# Patient Record
Sex: MALE | Race: WHITE
[De-identification: names, ages, dates, MRNs, and addresses within clinical notes are randomized per-mention and may not be internally consistent; named-entity substitution may affect disease eponyms.]

---

## 2019-03-06 ENCOUNTER — HOSPITAL ENCOUNTER (INPATIENT)
Dept: HOSPITAL 42 - ED | Age: 59
LOS: 5 days | Discharge: HOME | DRG: 197 | End: 2019-03-11
Attending: INTERNAL MEDICINE | Admitting: INTERNAL MEDICINE
Payer: MEDICAID

## 2019-03-06 DIAGNOSIS — K29.70: ICD-10-CM

## 2019-03-06 DIAGNOSIS — E87.6: ICD-10-CM

## 2019-03-06 DIAGNOSIS — K80.00: Primary | ICD-10-CM

## 2019-03-06 DIAGNOSIS — K52.9: ICD-10-CM

## 2019-03-06 DIAGNOSIS — K57.30: ICD-10-CM

## 2019-03-06 DIAGNOSIS — E78.00: ICD-10-CM

## 2019-03-06 DIAGNOSIS — N40.0: ICD-10-CM

## 2019-03-06 DIAGNOSIS — Z53.31: ICD-10-CM

## 2019-03-06 DIAGNOSIS — E78.5: ICD-10-CM

## 2019-03-06 DIAGNOSIS — K40.20: ICD-10-CM

## 2019-03-06 DIAGNOSIS — F17.210: ICD-10-CM

## 2019-03-06 LAB
ALBUMIN SERPL-MCNC: 4 G/DL (ref 3–4.8)
ALBUMIN/GLOB SERPL: 1.3 {RATIO} (ref 1.1–1.8)
ALT SERPL-CCNC: 23 U/L (ref 7–56)
APPEARANCE UR: CLEAR
AST SERPL-CCNC: 23 U/L (ref 17–59)
BASOPHILS # BLD AUTO: 0.03 K/MM3 (ref 0–2)
BASOPHILS NFR BLD: 0.3 % (ref 0–3)
BILIRUB UR-MCNC: NEGATIVE MG/DL
BUN SERPL-MCNC: 13 MG/DL (ref 7–21)
CALCIUM SERPL-MCNC: 9.5 MG/DL (ref 8.4–10.5)
COLOR UR: YELLOW
EOSINOPHIL # BLD: 0.2 10*3/UL (ref 0–0.7)
EOSINOPHIL NFR BLD: 1.3 % (ref 1.5–5)
ERYTHROCYTE [DISTWIDTH] IN BLOOD BY AUTOMATED COUNT: 13.8 % (ref 11.5–14.5)
GFR NON-AFRICAN AMERICAN: > 60
GLUCOSE UR STRIP-MCNC: NEGATIVE MG/DL
HGB BLD-MCNC: 16.1 G/DL (ref 14–18)
LEUKOCYTE ESTERASE UR-ACNC: NEGATIVE LEU/UL
LIPASE SERPL-CCNC: 54 U/L (ref 23–300)
LYMPHOCYTES # BLD: 1.8 10*3/UL (ref 1.2–3.4)
LYMPHOCYTES NFR BLD AUTO: 16 % (ref 22–35)
MCH RBC QN AUTO: 29.9 PG (ref 25–35)
MCHC RBC AUTO-ENTMCNC: 33.8 G/DL (ref 31–37)
MCV RBC AUTO: 88.7 FL (ref 80–105)
MONOCYTES # BLD AUTO: 0.8 10*3/UL (ref 0.1–0.6)
MONOCYTES NFR BLD: 6.7 % (ref 1–6)
PH UR STRIP: 6 [PH] (ref 4.7–8)
PLATELET # BLD: 258 10^3/UL (ref 120–450)
PMV BLD AUTO: 11.2 FL (ref 7–11)
PROT UR STRIP-MCNC: NEGATIVE MG/DL
RBC # BLD AUTO: 5.38 10^6/UL (ref 3.5–6.1)
RBC # UR STRIP: NEGATIVE /UL
SP GR UR STRIP: 1.02 (ref 1–1.03)
TROPONIN I SERPL-MCNC: < 0.01 NG/ML
UROBILINOGEN UR STRIP-ACNC: 0.2 E.U./DL
WBC # BLD AUTO: 11.3 10^3/UL (ref 4.5–11)

## 2019-03-06 NOTE — ED PDOC
Arrival/HPI





- General


Chief Complaint: Back Pain


Time Seen by Provider: 03/06/19 13:17


Historian: Patient





- History of Present Illness


Narrative History of Present Illness (Text): 





03/06/19 13:58


58 year old male, with no significant past medical history, presents to the 

emergency department complaining of back pain for the past 5 days. Patient 

states he took some pain medication which relieved the pain, but it came back 

and worsened yesterday. He notes secondary abdominal pain and chest discomfort. 

Patient denies fevers, chills, headache, dizziness, shortness of breath, dyspnea

on exertion, cough, nausea, vomiting, diarrhea, neck pain, or any other 

complaint.





PMD: Dr. Stokes


Time/Duration: < week


Symptom Onset: Gradual


Symptom Course: Worsening


Activities at Onset: Light


Context: Home





Past Medical History





- Provider Review


Nursing Documentation Reviewed: Yes





- Infectious Disease


Hx of Infectious Diseases: None





- Genitourinary/Gynecological


Hx Genitourinary Disorders: No





- Psychiatric


Hx Substance Use: No





- Anesthesia


Hx Anesthesia: No


Hx Anesthesia Reactions: No


Hx Malignant Hyperthermia: No





Family/Social History





- Physician Review


Nursing Documentation Reviewed: Yes


Family/Social History: No Known Family HX


Smoking Status: Never Smoked


Hx Alcohol Use: No


Hx Substance Use: No





Allergies/Home Meds


Allergies/Adverse Reactions: 


Allergies





No Known Allergies Allergy (Verified 03/06/19 13:05)


   








Home Medications: 


                                    Home Meds











 Medication  Instructions  Recorded  Confirmed


 


No Known Home Med  03/06/19 03/06/19














Review of Systems





- Physician Review


All systems were reviewed & negative as marked: Yes





- Review of Systems


Constitutional: absent: Fevers


Respiratory: absent: SOB





Physical Exam





- Physical Exam


Narrative Physical Exam (Text): 





03/06/19 14:01


Constitutional: No acute distress.


Head: Normocephalic.  Atraumatic.  


Eyes:  PERRL.


ENT:  Moist mucous membranes.


Neck:  Supple.


Cardiovascular:  Regular rate.


Chest: No tenderness.


Respiratory:  Clear to auscultation bilaterally.


GI:  Soft. Nontender. Nondistended. 


Back:  No CVA tenderness. no midline tenderness.


Musculoskeletal:  No tenderness or swelling of extremities.


Skin:  No rash. 


Neurologic:  Alert, no focal deficit.





Vital Signs Reviewed: Yes





Vital Signs











  Temp Pulse Resp BP Pulse Ox


 


 03/06/19 12:59  97.9 F  73  18  165/96 H  98











Temperature: Afebrile


Blood Pressure: Hypertensive


Pulse: Regular


Respiratory Rate: Normal


Appearance: Positive for: Well-Appearing, Non-Toxic, Comfortable


Pain Distress: None


Mental Status: Positive for: Alert and Oriented X 3





Medical Decision Making


ED Course and Treatment: 





03/06/19 14:02


Impression:


58 year old male who presents to the emergency department complaining of back 

pain. 





Plan:


-- Abdomen and Pelvis CT


-- EKG


-- Labs


-- IV fluids


-- Toradol


-- Urine Culture


-- Urinalysis 


-- Reassess and disposition





Progress Notes:





Pending CT. Signed out to ED night team.





- Lab Interpretations


I have reviewed the lab results: Yes





- RAD Interpretation


Radiology Orders: 











03/06/19 13:54


ABD & PELVIS IV CONTRAST ONLY [CT] Stat 











: Radiologist





- EKG Interpretation


Interpreted by ED Physician: Yes


Type: 12 lead EKG





- Medication Orders


Current Medication Orders: 











Sodium Chloride (Sodium Chloride 0.9%)  1,000 mls @ 999 mls/hr IV .Q1H1M STA


   Stop: 03/06/19 14:54





Discontinued Medications





Ketorolac Tromethamine (Toradol)  30 mg IVP STAT STA


   Stop: 03/06/19 13:55











- Scribe Statement


The provider has reviewed the documentation as recorded by the Shaye Negron





Provider Scribe Attestation:


All medical record entries made by the Scribe were at my direction and 

personally dictated by me. I have reviewed the chart and agree that the record 

accurately reflects my personal performance of the history, physical exam, 

medical decision making, and the department course for this patient. I have also

 personally directed, reviewed, and agree with the discharge instructions and 

disposition.








Disposition/Present on Arrival





- Present on Arrival


Any Indicators Present on Arrival: No


History of DVT/PE: No


History of Uncontrolled Diabetes: No


Urinary Catheter: No


History of Decub. Ulcer: No


History Surgical Site Infection Following: None





- Disposition


Have Diagnosis and Disposition been Completed?: No


Diagnosis: 


 Abdominal pain





Disposition Time: 18:51


Condition: STABLE


Forms:  [employer] (English)

## 2019-03-06 NOTE — ED PDOC
Physical Exam


Vital Signs Reviewed: Yes





Vital Signs











  Temp Pulse Resp BP Pulse Ox


 


 03/06/19 14:52   78  18  155/88 H  98


 


 03/06/19 12:59  97.9 F  73  18  165/96 H  98











Temperature: Afebrile


Blood Pressure: Normal


Pulse: Regular


Respiratory Rate: Normal


Appearance: Positive for: Well-Appearing, Non-Toxic, Comfortable


Pain Distress: None


Mental Status: Positive for: Alert and Oriented X 3





- Systems Exam


Head: Present: Atraumatic, Normocephalic


Pupils: Present: PERRL


Extroacular Muscles: Present: EOMI


Conjunctiva: Present: Normal


Mouth: Present: Moist Mucous Membranes


Neck: No: MIDLINE TENDERNESS


Respiratory/Chest: Present: Clear to Auscultation, Good Air Exchange.  No: 

Respiratory Distress, Accessory Muscle Use


Cardiovascular: Present: Regular Rate and Rhythm, Normal S1, S2.  No: Murmurs


Abdomen: Present: Tenderness (upper abdomen).  No: Distention, Peritoneal Signs


Back: Present: Normal Inspection


Upper Extremity: Present: Normal Inspection.  No: Cyanosis, Edema


Lower Extremity: Present: Normal Inspection.  No: Edema


Neurological: Present: GCS=15, Speech Normal


Skin: Present: Warm, Dry, Normal Color.  No: Rashes


Psychiatric: Present: Alert, Oriented x 3, Normal Insight, Normal Concentration





Medical Decision Making


ED Course and Treatment: 





03/06/19 19:34


Case endorsed to me by  for pending CT abd/pelvis, reassessment and final 

disposition. Patient is a 58 year old male who presented to the emergency 

department earlier today complaining of back pain. Patient is currently resting 

in bed in no acute distress. Patient have no new medical complaints. 





03/06/19 22:02


CT Abdomen and Pelvis:


LUNG BASES: 


The lung bases appear clear. No pleural effusions are seen. 


LIVER: 


There is hepatomegaly.  The liver measured an estimated 20.1 cm in the 

midclavicular line. There is also suggestion of mild associated hepatic 

steatosis. 


GALLBLADDER AND BILE DUCTS: 


There is apparent subtle 2.0 cm partially calcified cholelith within the 

gallbladder neck region. Additionally, subtle pericholecystic edema is noted.  

These findings are thought compatible with acute cholecystitis .No biliary 

ductal dilatation is evident. 


PANCREAS: 


Unremarkable. 


SPLEEN: 


Unremarkable. 


ADRENAL GLANDS: 


Unremarkable. 


KIDNEYS, URETERS, AND BLADDER: 


Both kidneys are normal in size and position.  A 2.5 cm cyst is seen in the 

anterior mid right renal pole. There is no hydronephrosis or hydroureter. No 

urinary calculi are seen. The urinary bladder is normal in size and 

configuration. 


STOMACH AND BOWEL: 


No evidence of bowel obstruction. There is mild mucosal wall thickening and 

fluid filling of the lumen of the stomach and small intestinal tract thought 

compatible with gastritis and diffuse enteritis. Infectious or inflammatory 

etiologies are thought most likely. There is diverticulosis coli seen within the

descending, sigmoid and rectosigmoid colon without evidence of acute 

diverticulitis. 


APPENDIX: 


No evidence of acute appendicitis on CT examination. 


PERITONEUM: 


No free fluid. No free air. Small bilateral inguinal hernias are present which 

each contain fat. 


LYMPH NODES: 


No lymphadenopathy is evident. 


REPRODUCTIVE: 


The seminal vesicles appeared normal and symmetrical in size.  There is 

prostatic hypertrophy noted.  The prostate gland measured 5.9 cm transversely. 


VASCULATURE: 


No evidence of abdominal aortic aneurysm. Minor atherosclerotic vascular 

plaquing is present. 


BONES: 


No aggressive appearing osseous lesion. No acute osseous pathology evident. 


IMPRESSION: 


1.  A solitary 2.0 cm partially calcified cholelith is seen in the gallbladder 

neck. 


2.  Pericholecystic edema.  Findings compatible with acute cholecystitis. 


3.  Evidence of gastritis and diffuse enteritis. 


4.  Hepatomegaly with associated steatosis. 


5.  Diverticulosis coli of the left hemicolon.  No acute diverticulitis. 


6.  Small bilateral inguinal hernias which each contain fat. 


7.  Prostatic hypertrophy.


Electronically signed on Mar 6, 2019 9:38:42 PM EST by:


Robinson Schultz M.D., ANNIA Certified By ABR & CBC[facility]


Fellowship Trained MRI and CT Specialist





03/06/19 22:09


Case discussed with medical resident and Dr. Lizama. Pt will be admitted to [facility] for cholecystitis under the hospitalist service. 





- Lab Interpretations


Lab Results: 





                                        











Troponin I  < 0.01 ng/mL  03/06/19  15:30    








                                        











Total Bilirubin  0.4 mg/dL (0.2-1.3)   03/06/19  15:30    


 


AST  23 U/L (17-59)   03/06/19  15:30    


 


ALT  23 U/L (7-56)   03/06/19  15:30    


 


Alkaline Phosphatase  85 U/L ()   03/06/19  15:30    


 


Total Protein  7.2 g/dL (5.8-8.3)   03/06/19  15:30    


 


Albumin  4.0 g/dL (3.0-4.8)   03/06/19  15:30    


 


Globulin  3.2 gm/dL  03/06/19  15:30    


 


Albumin/Globulin Ratio  1.3  (1.1-1.8)   03/06/19  15:30    








                                        











Lipase  54 U/L ()   03/06/19  15:30    








                                        











Urine Color  Yellow  (YELLOW)   03/06/19  14:40    


 


Urine Appearance  Clear  (CLEAR)   03/06/19  14:40    


 


Urine pH  6.0  (4.7-8.0)   03/06/19  14:40    


 


Ur Specific Gravity  1.025  (1.005-1.035)   03/06/19  14:40    


 


Urine Protein  Negative mg/dL (<30 mg/dL)   03/06/19  14:40    


 


Urine Glucose (UA)  Negative mg/dL (NEGATIVE)   03/06/19  14:40    


 


Urine Ketones  Negative mg/dL (NEGATIVE)   03/06/19  14:40    


 


Urine Blood  Negative  (NEGATIVE)   03/06/19  14:40    


 


Urine Nitrate  Negative  (NEGATIVE)   03/06/19  14:40    


 


Urine Bilirubin  Negative  (NEGATIVE)   03/06/19  14:40    


 


Urine Urobilinogen  0.2 E.U./dL (<1 E.U./dL)   03/06/19  14:40    


 


Ur Leukocyte Esterase  Negative Joseph/uL (NEGATIVE)   03/06/19  14:40    














- RAD Interpretation


Radiology Orders: 











03/06/19 13:54


ABD & PELVIS IV CONTRAST ONLY [CT] Stat 














- Medication Orders


Current Medication Orders: 














Discontinued Medications





Sodium Chloride (Sodium Chloride 0.9%)  1,000 mls @ 999 mls/hr IV .Q1H1M STA


   Stop: 03/06/19 14:54


   Last Admin: 03/06/19 14:44  Dose: 999 mls/hr





eMAR Start Stop


 Document     03/06/19 14:44  EAR  (Rec: 03/06/19 14:45  EAR  [id number])


     Intravenous Solution


      Start Date                                 03/06/19


      Start Time                                 14:44


      End Date                                   03/06/19


      End time                                   15:50


      Total Infusion Time                        66





Ketorolac Tromethamine (Toradol)  30 mg IVP STAT STA


   Stop: 03/06/19 13:55


   Last Admin: 03/06/19 14:47  Dose: 30 mg





MAR Pain Assessment


 Document     03/06/19 14:47  EAR  (Rec: 03/06/19 14:48  EAR  [id number])


     Pain Reassessment


      Is this a pain reassessment?               Yes


IVP Administration


 Document     03/06/19 14:47  EAR  (Rec: 03/06/19 14:48  EAR  [id number])


     Charges for Administration


      # of IVP Administrations                   1














- Scribe Statement


The provider has reviewed the documentation as recorded by the Scribe Dalyngs Duvelsaint training with Mild[employer].





All medical record entries made by the Shaye were at my direction and 

personally dictated by me. I have reviewed the chart and agree that the record 

accurately reflects my personal performance of the history, physical exam, 

medical decision making, and the department course for this patient. I have also

 personally directed, reviewed, and agree with the discharge instructions and 

disposition. 





Disposition/Present on Arrival





- Present on Arrival


Any Indicators Present on Arrival: No


History of DVT/PE: No


History of Uncontrolled Diabetes: No


Urinary Catheter: No


History of Decub. Ulcer: No


History Surgical Site Infection Following: None





- Disposition


Have Diagnosis and Disposition been Completed?: Yes


Diagnosis: 


 Abdominal pain, Cholecystitis





Disposition: HOSPITALIZED


Disposition Time: 22:15


Patient Plan: Admission


Patient Problems: 


                             Current Active Problems











Problem Status Onset


 


Abdominal pain Acute 











Condition: STABLE


Forms:  [employer] (English)

## 2019-03-06 NOTE — CP.PCM.CON
History of Present Illness





- History of Present Illness


History of Present Illness: 


General Surgery Consult note for Dr. Palumbo


   Consulted for Cholecystitis





Patient is a 58 M with no PMH who presents with RUQ pain radiating to back x 5-7

days.  Patient otherwise denies any HA, n/v, f/c, stool changes, chest pain, SOB

and dizziness.





PMH: denies 


PSH: appendectomy (in childhood)


Social: denies ETOH, smoking and illicit drugs


All: nkda








Review of Systems





- Review of Systems


All systems: reviewed and no additional remarkable complaints except (as per 

HPI)





Past Patient History





- Infectious Disease


Hx of Infectious Diseases: None





- Past Social History


Smoking Status: Never Smoked





- GENITOURINARY/GYNECOLOGICAL


Hx Genitourinary Disorders: No





- PSYCHIATRIC


Hx Substance Use: No





- ANESTHESIA


Hx Anesthesia: No


Hx Anesthesia Reactions: No


Hx Malignant Hyperthermia: No





Meds


Allergies/Adverse Reactions: 


                                    Allergies











Allergy/AdvReac Type Severity Reaction Status Date / Time


 


No Known Allergies Allergy   Verified 03/06/19 13:05














- Medications


Medications: 


                               Current Medications





Metronidazole (Flagyl)  500 mg in 100 mls @ 100 mls/hr IVPB Q8 SANTI; Protocol


Ceftriaxone Sodium (Rocephin 1 Gram Ivpb)  1 gm in 100 mls @ 100 mls/hr IVPB 

DAILY SANTI; Protocol


Sodium Chloride (Sodium Chloride 0.9%)  1,000 mls @ 100 mls/hr IV .Q10H SANTI


Morphine Sulfate (Morphine)  2 mg IVP Q6H PRN


   PRN Reason: Pain, severe (8-10)


Ondansetron HCl (Zofran Inj)  4 mg IVP Q6H PRN


   PRN Reason: Nausea/Vomiting


Pantoprazole Sodium (Protonix Inj)  40 mg IVP DAILY SANTI











Physical Exam





- Constitutional


Appears: Well, Non-toxic, No Acute Distress





- Head Exam


Head Exam: ATRAUMATIC





- Eye Exam


Eye Exam: EOMI





- ENT Exam


ENT Exam: Mucous Membranes Moist





- Respiratory Exam


Respiratory Exam: NORMAL BREATHING PATTERN





- Cardiovascular Exam


Cardiovascular Exam: REGULAR RHYTHM





- GI/Abdominal Exam


GI & Abdominal Exam: Soft, Tenderness (RUQ).  absent: Guarding





- Extremities Exam


Extremities exam: Negative for: calf tenderness





- Neurological Exam


Neurological exam: Alert, Oriented x3





- Psychiatric Exam


Psychiatric exam: Normal Affect, Normal Mood





- Skin


Skin Exam: Dry, Intact, Normal Color, Warm





Results





- Vital Signs


Recent Vital Signs: 


                                Last Vital Signs











Temp  97.9 F   03/06/19 12:59


 


Pulse  78   03/06/19 14:52


 


Resp  18   03/06/19 14:52


 


BP  155/88 H  03/06/19 14:52


 


Pulse Ox  98   03/06/19 14:52














- Labs


Result Diagrams: 


                                 03/08/19 06:10





                                 03/08/19 06:10


Labs: 


                         Laboratory Results - last 24 hr











  03/06/19 03/06/19 03/06/19





  14:40 14:40 15:30


 


WBC  11.3 H  


 


RBC  5.38  


 


Hgb  16.1  


 


Hct  47.7  


 


MCV  88.7  


 


MCH  29.9  


 


MCHC  33.8  


 


RDW  13.8  


 


Plt Count  258  


 


MPV  11.2 H  


 


Neut % (Auto)  75.7 H  


 


Lymph % (Auto)  16.0 L  


 


Mono % (Auto)  6.7 H  


 


Eos % (Auto)  1.3 L  


 


Baso % (Auto)  0.3  


 


Lymph # (Auto)  1.8  


 


Mono # (Auto)  0.8 H  


 


Eos # (Auto)  0.2  


 


Baso # (Auto)  0.03  


 


Absolute Neuts (auto)  8.52 H  


 


Sodium    139


 


Potassium    4.4


 


Chloride    106


 


Carbon Dioxide    26


 


Anion Gap    12


 


BUN    13


 


Creatinine    0.8


 


Est GFR ( Amer)    > 60


 


Est GFR (Non-Af Amer)    > 60


 


Random Glucose    93


 


Calcium    9.5


 


Total Bilirubin    0.4


 


AST    23


 


ALT    23


 


Alkaline Phosphatase    85


 


Total Creatine Kinase    88


 


Troponin I    < 0.01


 


Total Protein    7.2


 


Albumin    4.0


 


Globulin    3.2


 


Albumin/Globulin Ratio    1.3


 


Lipase    54


 


Urine Color   Yellow 


 


Urine Appearance   Clear 


 


Urine pH   6.0 


 


Ur Specific Gravity   1.025 


 


Urine Protein   Negative 


 


Urine Glucose (UA)   Negative 


 


Urine Ketones   Negative 


 


Urine Blood   Negative 


 


Urine Nitrate   Negative 


 


Urine Bilirubin   Negative 


 


Urine Urobilinogen   0.2 


 


Ur Leukocyte Esterase   Negative 














Assessment & Plan





- Assessment and Plan (Free Text)


Assessment: 





58 yr old male with cholelithiasis and acute cholecystitis


Plan: 


IVF


rocephin, flagyl


pain control


zofran


SCD


Abd US


possible OR tomorrow for cholecystectomy


will discuss with Dr. Linwood Grace, PGY 1








- Date & Time


Date: 03/07/19


Time: 23:35

## 2019-03-07 LAB
ALBUMIN SERPL-MCNC: 3.7 G/DL (ref 3–4.8)
ALBUMIN/GLOB SERPL: 1.1 {RATIO} (ref 1.1–1.8)
ALT SERPL-CCNC: 20 U/L (ref 7–56)
APTT BLD: 34 SECONDS (ref 26.9–38.3)
AST SERPL-CCNC: 31 U/L (ref 17–59)
BASOPHILS # BLD AUTO: 0.02 K/MM3 (ref 0–2)
BASOPHILS NFR BLD: 0.2 % (ref 0–3)
BUN SERPL-MCNC: 10 MG/DL (ref 7–21)
CALCIUM SERPL-MCNC: 8.8 MG/DL (ref 8.4–10.5)
EOSINOPHIL # BLD: 0.2 10*3/UL (ref 0–0.7)
EOSINOPHIL NFR BLD: 1.6 % (ref 1.5–5)
ERYTHROCYTE [DISTWIDTH] IN BLOOD BY AUTOMATED COUNT: 13.9 % (ref 11.5–14.5)
GFR NON-AFRICAN AMERICAN: > 60
HGB BLD-MCNC: 14.3 G/DL (ref 14–18)
INR PPP: 1.14
LYMPHOCYTES # BLD: 1.7 10*3/UL (ref 1.2–3.4)
LYMPHOCYTES NFR BLD AUTO: 17.2 % (ref 22–35)
MCH RBC QN AUTO: 28.8 PG (ref 25–35)
MCHC RBC AUTO-ENTMCNC: 32.8 G/DL (ref 31–37)
MCV RBC AUTO: 87.7 FL (ref 80–105)
MONOCYTES # BLD AUTO: 0.9 10*3/UL (ref 0.1–0.6)
MONOCYTES NFR BLD: 8.8 % (ref 1–6)
PLATELET # BLD: 233 10^3/UL (ref 120–450)
PMV BLD AUTO: 10.9 FL (ref 7–11)
PROTHROMBIN TIME: 12.9 SECONDS (ref 9.4–12.5)
RBC # BLD AUTO: 4.97 10^6/UL (ref 3.5–6.1)
WBC # BLD AUTO: 9.7 10^3/UL (ref 4.5–11)

## 2019-03-07 RX ADMIN — METRONIDAZOLE SCH MLS/HR: 500 INJECTION, SOLUTION INTRAVENOUS at 21:23

## 2019-03-07 RX ADMIN — MORPHINE SULFATE PRN MG: 2 INJECTION, SOLUTION INTRAMUSCULAR; INTRAVENOUS at 09:55

## 2019-03-07 RX ADMIN — METRONIDAZOLE SCH MLS/HR: 500 INJECTION, SOLUTION INTRAVENOUS at 05:08

## 2019-03-07 RX ADMIN — MORPHINE SULFATE PRN MG: 2 INJECTION, SOLUTION INTRAMUSCULAR; INTRAVENOUS at 21:22

## 2019-03-07 RX ADMIN — CEFTRIAXONE SCH MLS/HR: 1 INJECTION, SOLUTION INTRAVENOUS at 11:31

## 2019-03-07 RX ADMIN — METRONIDAZOLE SCH MLS/HR: 500 INJECTION, SOLUTION INTRAVENOUS at 14:42

## 2019-03-07 NOTE — CP.PCM.HP
<BrittneylilianachristinaPhillip - Last Filed: 19 02:33>





History of Present Illness





- History of Present Illness


History of Present Illness: 





PGY1 Hospitalist History and Physical Exam Note for Dr. Lizama 


This is a 58 year old Icelandic speaking M with PMH of hyperlipidemia who [place] 

[place] [facility] x5 days. Patient states he took some pain medication 

which relieved the pain, but it came back and moved towards his chest and then 

moved towards his abdomen. The pain worsened yesterday. Patient also notes 

associated nausea, anorexia, and abdominal distention. ROS is otherwise 

unremarkable for fevers, chills, headache, dizziness, shortness of breath, 

dyspnea on exertion, cough, nausea, vomiting, diarrhea, neck pain, or any other 

complaint.





PMH: Hyperlipidemia


PSH: Appendectomy 


Family Hx: Father  (hx HTN, DM, sudden death 2/2 unknown cause); Mother:

; hx HTN 


Social Hx Denies ETOH. Admits to Tobacco 1/2 pack per day x30 years. No 

recreational drugs 


Medications: None 


Allergies: NKDA 


PMD: Dr. Stokes








Present on Admission





- Present on Admission


Any Indicators Present on Admission: No


History of DVT/PE: No


History of Uncontrolled Diabetes: No


Urinary Catheter: No


Decubitus Ulcer Present: No





Review of Systems





- Review of Systems


All systems: reviewed and no additional remarkable complaints except (as 

mentioned in HPI)





Past Patient History





- Infectious Disease


Hx of Infectious Diseases: None





- Past Social History


Smoking Status: Heavy Smoker > 10 Cigarettes Daily





- CARDIAC


Hx Cardiac Disorders: Yes


Hx Hypercholesterolemia: Yes





- MUSCULOSKELETAL/RHEUMATOLOGICAL


Hx Falls: No





- GENITOURINARY/GYNECOLOGICAL


Hx Genitourinary Disorders: No





- PSYCHIATRIC


Hx Substance Use: No





- SURGICAL HISTORY


Hx Surgeries: Yes


Hx Cholecystectomy: Yes





- ANESTHESIA


Hx Anesthesia: No


Hx Anesthesia Reactions: No


Hx Malignant Hyperthermia: No





Meds


Allergies/Adverse Reactions: 


                                    Allergies











Allergy/AdvReac Type Severity Reaction Status Date / Time


 


No Known Allergies Allergy   Verified 19 13:05














Physical Exam





- Additional Findings


Additional findings: 





- Constitutional


Appears: Well, Non-toxic, No Acute Distress





- Head Exam


Head Exam: ATRAUMATIC





- Eye Exam


Eye Exam: EOMI





- ENT Exam


ENT Exam: Mucous Membranes Moist





- Respiratory Exam


Respiratory Exam: NORMAL BREATHING PATTERN





- Cardiovascular Exam


Cardiovascular Exam: REGULAR RHYTHM





- GI/Abdominal Exam


GI & Abdominal Exam: Soft, Tenderness (RUQ).  absent: Guarding





- Extremities Exam


Extremities exam: Negative for: calf tenderness





- Neurological Exam


Neurological exam: Alert, Oriented x3





- Psychiatric Exam


Psychiatric exam: Normal Affect, Normal Mood





- Skin


Skin Exam: Dry, Intact, Normal Color, Warm





Results





- Vital Signs


Recent Vital Signs: 





                                Last Vital Signs











Temp  97.9 F   19 12:59


 


Pulse  80   19 22:30


 


Resp  20   19 01:39


 


BP  132/82   19 22:30


 


Pulse Ox  98   19 22:30














- Labs


Result Diagrams: 


                                 19 14:40





                                 19 15:30


Labs: 





                         Laboratory Results - last 24 hr











  19





  14:40 14:40 15:30


 


WBC  11.3 H  


 


RBC  5.38  


 


Hgb  16.1  


 


Hct  47.7  


 


MCV  88.7  


 


MCH  29.9  


 


MCHC  33.8  


 


RDW  13.8  


 


Plt Count  258  


 


MPV  11.2 H  


 


Neut % (Auto)  75.7 H  


 


Lymph % (Auto)  16.0 L  


 


Mono % (Auto)  6.7 H  


 


Eos % (Auto)  1.3 L  


 


Baso % (Auto)  0.3  


 


Lymph # (Auto)  1.8  


 


Mono # (Auto)  0.8 H  


 


Eos # (Auto)  0.2  


 


Baso # (Auto)  0.03  


 


Absolute Neuts (auto)  8.52 H  


 


Sodium    139


 


Potassium    4.4


 


Chloride    106


 


Carbon Dioxide    26


 


Anion Gap    12


 


BUN    13


 


Creatinine    0.8


 


Est GFR ( Amer)    > 60


 


Est GFR (Non-Af Amer)    > 60


 


Random Glucose    93


 


Calcium    9.5


 


Total Bilirubin    0.4


 


AST    23


 


ALT    23


 


Alkaline Phosphatase    85


 


Total Creatine Kinase    88


 


Troponin I    < 0.01


 


Total Protein    7.2


 


Albumin    4.0


 


Globulin    3.2


 


Albumin/Globulin Ratio    1.3


 


Lipase    54


 


Urine Color   Yellow 


 


Urine Appearance   Clear 


 


Urine pH   6.0 


 


Ur Specific Gravity   1.025 


 


Urine Protein   Negative 


 


Urine Glucose (UA)   Negative 


 


Urine Ketones   Negative 


 


Urine Blood   Negative 


 


Urine Nitrate   Negative 


 


Urine Bilirubin   Negative 


 


Urine Urobilinogen   0.2 


 


Ur Leukocyte Esterase   Negative 














Assessment & Plan





- Assessment and Plan (Free Text)


Assessment: 








This is a 58 year old Icelandic speaking M with PMH of hyperlipidemia who [place] 

[place] [facility] [place] x5 days. Patient states he took some pain medication 

which relieved the pain, but it came back and moved towards his chest and then 

moved towards his abdomen.





Abdominal Pain / Back pain likely secondary to Cholecystitis 


- CT ABD/PELVIS: 


IMPRESSION: 


1.  A solitary 2.0 cm partially calcified cholelith is seen in the gallbladder 

neck. 


2.  Pericholecystic edema.  Findings compatible with acute cholecystitis. 


3.  Evidence of gastritis and diffuse enteritis. 


4.  Hepatomegaly with associated steatosis. 


5.  Diverticulosis coli of the left hemicolon.  No acute diverticulitis. 


6.  Small bilateral inguinal hernias which each contain fat. 


7.  Prostatic hypertrophy.


- CXR f/u report


- EKG obtained unremarkable for T wave abnormalities


- Leukocytosis (11.3)


- General Surgery (Dr. Palumbo) consulted; recommendations appreciated


- NPO 


- Morphine IVP 2mg Q6 PRN for severe pain 


- Zofran PRN for nausea


- IVF NS @100mls/hr


- Abx: Rocephin, Flagyl


- F/U blood cultures


- AM labs (CBC, CMP, Mg, Phos) 





PPx: 


- Diet: NPO 


- GI: Protonix 


- DVT: SCD





Patient seen and case discussed with Dr. Alda Sawant PGY1 








<Angeles Lizama - Last Filed: 19 06:51>





Results





- Vital Signs


Recent Vital Signs: 





                                Last Vital Signs











Temp  97.9 F   19 12:59


 


Pulse  80   19 22:30


 


Resp  20   19 01:39


 


BP  132/82   19 22:30


 


Pulse Ox  98   19 22:30














- Labs


Result Diagrams: 


                                 19 14:40





                                 19 15:30


Labs: 





                         Laboratory Results - last 24 hr











  19





  14:40 14:40 15:30


 


WBC  11.3 H  


 


RBC  5.38  


 


Hgb  16.1  


 


Hct  47.7  


 


MCV  88.7  


 


MCH  29.9  


 


MCHC  33.8  


 


RDW  13.8  


 


Plt Count  258  


 


MPV  11.2 H  


 


Neut % (Auto)  75.7 H  


 


Lymph % (Auto)  16.0 L  


 


Mono % (Auto)  6.7 H  


 


Eos % (Auto)  1.3 L  


 


Baso % (Auto)  0.3  


 


Lymph # (Auto)  1.8  


 


Mono # (Auto)  0.8 H  


 


Eos # (Auto)  0.2  


 


Baso # (Auto)  0.03  


 


Absolute Neuts (auto)  8.52 H  


 


Sodium    139


 


Potassium    4.4


 


Chloride    106


 


Carbon Dioxide    26


 


Anion Gap    12


 


BUN    13


 


Creatinine    0.8


 


Est GFR ( Amer)    > 60


 


Est GFR (Non-Af Amer)    > 60


 


Random Glucose    93


 


Calcium    9.5


 


Total Bilirubin    0.4


 


AST    23


 


ALT    23


 


Alkaline Phosphatase    85


 


Total Creatine Kinase    88


 


Troponin I    < 0.01


 


Total Protein    7.2


 


Albumin    4.0


 


Globulin    3.2


 


Albumin/Globulin Ratio    1.3


 


Lipase    54


 


Urine Color   Yellow 


 


Urine Appearance   Clear 


 


Urine pH   6.0 


 


Ur Specific Gravity   1.025 


 


Urine Protein   Negative 


 


Urine Glucose (UA)   Negative 


 


Urine Ketones   Negative 


 


Urine Blood   Negative 


 


Urine Nitrate   Negative 


 


Urine Bilirubin   Negative 


 


Urine Urobilinogen   0.2 


 


Ur Leukocyte Esterase   Negative 














Attending/Attestation





- Attestation


I have personally seen and examined this patient.: Yes


I have fully participated in the care of the patient.: Yes


I have reviewed all pertinent clinical information: Yes


Notes (Text): 





19 06:51


Seen and examined. Discussed with resident. Agree with note. Pt. also C/O GERD 

like symptoms, Protonix will be added.

## 2019-03-07 NOTE — US
Date of service: 



03/07/2019



HISTORY:

RUQ pain



COMPARISON:

None.



TECHNIQUE:

Sonographic evaluation of the abdomen.



FINDINGS:



LIVER:

Measures 20 cm.  Increased echogenicity of the liver parenchyma. No 

mass. No intrahepatic bile duct dilatation.



GALLBLADDER:

There is a large stone in the neck of the gallbladder measuring 2.7 

cm in length.  There thickening of the gallbladder wall measuring 6 

mm.



COMMON BILE DUCT:

Measures 3.3 mm. No stones. No dilatation.



PANCREAS:

Not well visualized



RIGHT KIDNEY:

Measures 10.0 x 4.8 x 6.5cm.  Normal echogenicity. No calculus, mass, 

or hydronephrosis.  2.2 cm cyst 



LEFT KIDNEY:

Measures 11.7 x 4.7 x 6.0cm.  Normal echogenicity. No calculus, mass, 

or hydronephrosis.



SPLEEN:

Normal in size and contour. No mass.  10.8 x 4.3 x 4.0 cm 



AORTA:

Not visualized 



IVC:

Not visualized 



OTHER FINDINGS:

The report concurs with the preliminary [facility] report 



IMPRESSION:

There is a large stone in the neck of the gallbladder measuring 2.7 

cm in length.  There thickening of the gallbladder wall measuring 6 

mm.



Fatty infiltration of the liver

## 2019-03-07 NOTE — CP.PCM.PN
Subjective





- Date & Time of Evaluation


Date of Evaluation: 03/07/19


Time of Evaluation: 08:00





- Subjective


Subjective: 





Rusty Elizabeth, PGY-1 Progress Note for Dr. Palumbo





Patient seen and evaluated at bedside. No acute events reported overnight since 

admission. Reports RUQ abdominal pain but denies fevers, chills, N/V, 

constipation, diarrhea.





Objective





- Vital Signs/Intake and Output


Vital Signs (last 24 hours): 


                                        











Temp Pulse Resp BP Pulse Ox


 


 99.2 F   79   22   133/73   96 


 


 03/07/19 06:00  03/07/19 06:00  03/07/19 06:00  03/07/19 06:00  03/07/19 06:00








Intake and Output: 


                                        











 03/07/19 03/07/19





 06:59 18:59


 


Intake Total 500 


 


Balance 500 














- Medications


Medications: 


                               Current Medications





Metronidazole (Flagyl)  500 mg in 100 mls @ 100 mls/hr IVPB Q8 [facility]; Protocol


   Last Admin: 03/07/19 05:08 Dose:  100 mls/hr


Ceftriaxone Sodium (Rocephin 1 Gram Ivpb)  1 gm in 100 mls @ 100 mls/hr IVPB 

DAILY [facility]; Protocol


Sodium Chloride (Sodium Chloride 0.9%)  1,000 mls @ 100 mls/hr IV .Q10H [facility]


   Last Admin: 03/07/19 01:33 Dose:  100 mls/hr


Morphine Sulfate (Morphine)  2 mg IVP Q6H PRN


   PRN Reason: Pain, severe (8-10)


Ondansetron HCl (Zofran Inj)  4 mg IVP Q6H PRN


   PRN Reason: Nausea/Vomiting


Pantoprazole Sodium (Protonix Inj)  40 mg IVP DAILY [facility]











- Labs


Labs: 


                                        





                                 03/07/19 06:50 





                                 03/07/19 06:50 





                                        











PT  12.9 SECONDS (9.4-12.5)  H  03/07/19  06:50    


 


INR  1.14   03/07/19  06:50    


 


APTT  34.0 Seconds (26.9-38.3)   03/07/19  06:50    














- Additional Findings


Additional findings: 





- Constitutional


Appears: Well, Non-toxic, No Acute Distress





- Head Exam


Head Exam: ATRAUMATIC





- Eye Exam


Eye Exam: EOMI. Nonicteric sclera.





- ENT Exam


ENT Exam: Mucous Membranes Moist





- Respiratory Exam


Respiratory Exam: NORMAL BREATHING PATTERN





- Cardiovascular Exam


Cardiovascular Exam: REGULAR RHYTHM





- GI/Abdominal Exam


GI & Abdominal Exam: Soft, Tenderness (RUQ). Equivocal Abel's sign with mild 

tenderness elicited.  absent: Guarding





- Extremities Exam


Extremities exam: Negative for: calf tenderness





- Neurological Exam


Neurological exam: Alert, Oriented x3





- Psychiatric Exam


Psychiatric exam: Normal Affect, Normal Mood





- Skin


Skin Exam: Dry, Intact, Normal Color, Warm. No juandice





Assessment and Plan





- Assessment and Plan (Free Text)


Assessment: 





58 yr old male with cholelithiasis and acute cholecystitis





Plan: 


- IVF, NPO


- ABx


- F/u final U/S report


- Pain control


- Zofran


- SCD


- Lap Anu likely tomorrow





Patient seen and plan discussed with Dr. Linwood Elizabeth, PGY 1

## 2019-03-07 NOTE — RAD
Date of service: 



03/07/2019



HISTORY:

 preoperative 



COMPARISON:

None available.



FINDINGS:



LUNGS:

No active pulmonary disease.



PLEURA:

No significant pleural effusion identified, no pneumothorax apparent.



CARDIOVASCULAR:

No aortic atherosclerotic calcification present.



 Normal cardiac size. No pulmonary vascular congestion. 



OSSEOUS STRUCTURES:

No significant abnormalities.



VISUALIZED UPPER ABDOMEN:

Normal.



OTHER FINDINGS:

None.



IMPRESSION:

No acute cardiopulmonary disease appreciated.

## 2019-03-07 NOTE — CT
Date of service: 



03/06/2019



PROCEDURE:  CT Abdomen and Pelvis with contrast



HISTORY:

abd pain



COMPARISON:

None.



TECHNIQUE:

Contrast dose: 



Radiation dose:



Total exam DLP = 874.26 mGy-cm.



This CT exam was performed using one or more of the following dose 

reduction techniques: Automated exposure control, adjustment of the 

mA and/or kV according to patient size, and/or use of iterative 

reconstruction technique.



FINDINGS:



LOWER THORAX:

Unremarkable. 



LIVER:

Unremarkable. No gross lesion or ductal dilatation. 



GALLBLADDER AND BILE DUCTS:

2 cm stone in the neck of the gallbladder.  Pericholecystic fluid 

consistent with cholecystitis 



PANCREAS:

Unremarkable. No gross lesion or ductal dilatation.



SPLEEN:

Unremarkable. 



ADRENALS:

Unremarkable. No mass. 



KIDNEYS AND URETERS:

Unremarkable. No hydronephrosis. No solid mass. 



VASCULATURE:

Unremarkable. No aortic aneurysm. No aortic atherosclerotic 

calcification or mural plaque present.



BOWEL:

Unremarkable. No obstruction. No gross mural thickening. 

Diverticulosis of the sigmoid colon without evidence of diverticulitis



APPENDIX:

Normal appendix. 



PERITONEUM:

Unremarkable. No free fluid. No free air. 



LYMPH NODES:

Unremarkable. No enlarged lymph nodes. 



BLADDER:

Unremarkable. 



REPRODUCTIVE:

Unremarkable. 



BONES:

No acute fracture. 



OTHER FINDINGS:

The report concurs with the preliminary [facility] report



IMPRESSION:

2 cm stone in the neck of the gallbladder.  Pericholecystic fluid 

consistent with cholecystitis

## 2019-03-08 LAB
ALBUMIN SERPL-MCNC: 3.6 G/DL (ref 3–4.8)
ALBUMIN/GLOB SERPL: 1.1 {RATIO} (ref 1.1–1.8)
ALT SERPL-CCNC: 18 U/L (ref 7–56)
AST SERPL-CCNC: 21 U/L (ref 17–59)
BASOPHILS # BLD AUTO: 0.02 K/MM3 (ref 0–2)
BASOPHILS NFR BLD: 0.2 % (ref 0–3)
BUN SERPL-MCNC: 11 MG/DL (ref 7–21)
CALCIUM SERPL-MCNC: 8.8 MG/DL (ref 8.4–10.5)
EOSINOPHIL # BLD: 0.1 10*3/UL (ref 0–0.7)
EOSINOPHIL NFR BLD: 1.2 % (ref 1.5–5)
ERYTHROCYTE [DISTWIDTH] IN BLOOD BY AUTOMATED COUNT: 14 % (ref 11.5–14.5)
GFR NON-AFRICAN AMERICAN: > 60
HGB BLD-MCNC: 13.9 G/DL (ref 14–18)
INR PPP: 1.27
LYMPHOCYTES # BLD: 2.2 10*3/UL (ref 1.2–3.4)
LYMPHOCYTES NFR BLD AUTO: 24 % (ref 22–35)
MCH RBC QN AUTO: 29.1 PG (ref 25–35)
MCHC RBC AUTO-ENTMCNC: 33.1 G/DL (ref 31–37)
MCV RBC AUTO: 88.1 FL (ref 80–105)
MONOCYTES # BLD AUTO: 0.9 10*3/UL (ref 0.1–0.6)
MONOCYTES NFR BLD: 9.9 % (ref 1–6)
PLATELET # BLD: 216 10^3/UL (ref 120–450)
PMV BLD AUTO: 10.8 FL (ref 7–11)
PROTHROMBIN TIME: 14.4 SECONDS (ref 9.4–12.5)
RBC # BLD AUTO: 4.77 10^6/UL (ref 3.5–6.1)
WBC # BLD AUTO: 9.3 10^3/UL (ref 4.5–11)

## 2019-03-08 PROCEDURE — 0FT40ZZ RESECTION OF GALLBLADDER, OPEN APPROACH: ICD-10-PCS | Performed by: SURGERY

## 2019-03-08 PROCEDURE — 0FJ44ZZ INSPECTION OF GALLBLADDER, PERCUTANEOUS ENDOSCOPIC APPROACH: ICD-10-PCS | Performed by: SURGERY

## 2019-03-08 RX ADMIN — MORPHINE SULFATE PRN MG: 2 INJECTION, SOLUTION INTRAMUSCULAR; INTRAVENOUS at 07:51

## 2019-03-08 RX ADMIN — CEFTRIAXONE SCH MLS: 1 INJECTION, SOLUTION INTRAVENOUS at 11:17

## 2019-03-08 RX ADMIN — METRONIDAZOLE SCH MLS/HR: 500 INJECTION, SOLUTION INTRAVENOUS at 05:08

## 2019-03-08 RX ADMIN — HYDROMORPHONE HYDROCHLORIDE PRN MG: 1 INJECTION, SOLUTION INTRAMUSCULAR; INTRAVENOUS; SUBCUTANEOUS at 14:36

## 2019-03-08 RX ADMIN — HYDROMORPHONE HYDROCHLORIDE PRN MG: 1 INJECTION, SOLUTION INTRAMUSCULAR; INTRAVENOUS; SUBCUTANEOUS at 14:18

## 2019-03-08 RX ADMIN — HYDROMORPHONE HYDROCHLORIDE PRN MG: 1 INJECTION, SOLUTION INTRAMUSCULAR; INTRAVENOUS; SUBCUTANEOUS at 20:02

## 2019-03-08 RX ADMIN — METRONIDAZOLE SCH MLS/HR: 500 INJECTION, SOLUTION INTRAVENOUS at 11:30

## 2019-03-08 RX ADMIN — OXYCODONE HYDROCHLORIDE AND ACETAMINOPHEN PRN TAB: 5; 325 TABLET ORAL at 23:36

## 2019-03-08 RX ADMIN — HYDROMORPHONE HYDROCHLORIDE PRN MG: 1 INJECTION, SOLUTION INTRAMUSCULAR; INTRAVENOUS; SUBCUTANEOUS at 14:53

## 2019-03-08 RX ADMIN — METRONIDAZOLE SCH MLS/HR: 500 INJECTION, SOLUTION INTRAVENOUS at 23:28

## 2019-03-08 NOTE — CARD
--------------- APPROVED REPORT --------------





Date of service: 03/08/2019



EKG Measurement

Heart [id number]

IL [id number]

[id number]

[id number]

[id number]



<Conclusion>

Normal sinus rhythm

Right bundle branch block

Abnormal ECG

## 2019-03-08 NOTE — PCM.SURG1
Surgeon's Initial Post Op Note





- Surgeon's Notes


Surgeon: Dr. Palumbo


Assistant: Shant PGY3; Rosa PGY2


Type of Anesthesia: General Endo, Local


Anesthesia Administered By: Dr. Hart


Pre-Operative Diagnosis: Acute on Chronic Cholecystitis


Operative Findings: See operative report. Inflammed, thickened gallbladder


Post-Operative Diagnosis: Same


Operation Performed: Laparoscopic Converted to Open Subtotal Cholecystectomy


Specimen/Specimens Removed: Gallbladder. GB aspirate sent for Culture and gram 

stain


Estimated Blood Loss: EBL {In ML}: 150


Blood Products Given: N/A


Drains Used: No Drains


Post-Op Condition: Good


Date of Surgery/Procedure: 03/08/19


Time of Surgery/Procedure: 14:15

## 2019-03-08 NOTE — CP.PCM.PN
<Lindy Harvey L - Last Filed: 03/08/19 16:27>





Subjective





- Date & Time of Evaluation


Date of Evaluation: 03/08/19


Time of Evaluation: 07:11





- Subjective


Subjective: 





Resident Progress Note for Hospitalist Service





Patient examined at bedside. No acute events overnight. Patient admits to 

abdominal pain this morning unchanged from prior. Patient now s/p laparoscopic 

converted to open subtotal cholecystectomy. Denies fevers, chills, nausea, 

vomiting, chest pain, shortness of breath. 





Objective





- Vital Signs/Intake and Output


Vital Signs (last 24 hours): 


                                        











Temp Pulse Resp BP Pulse Ox


 


 98.2 F   75   18   129/61   98 


 


 03/07/19 21:43  03/07/19 21:43  03/07/19 21:43  03/07/19 21:43  03/07/19 21:43








Intake and Output: 


                                        











 03/08/19 03/08/19





 06:59 18:59


 


Intake Total 480 


 


Output Total 650 


 


Balance -170 














- Medications


Medications: 


                               Current Medications





Metronidazole (Flagyl)  500 mg in 100 mls @ 100 mls/hr IVPB Q8 [facility]; Protocol


   Last Admin: 03/08/19 05:08 Dose:  100 mls/hr


Ceftriaxone Sodium (Rocephin 1 Gram Ivpb)  1 gm in 100 mls @ 100 mls/hr IVPB 

DAILY [facility]; Protocol


   Last Admin: 03/07/19 11:31 Dose:  100 mls/hr


Sodium Chloride (Sodium Chloride 0.9%)  1,000 mls @ 100 mls/hr IV .Q10H [facility]


   Last Admin: 03/08/19 05:07 Dose:  100 mls/hr


Morphine Sulfate (Morphine)  2 mg IVP Q6H PRN


   PRN Reason: Pain, severe (8-10)


   Last Admin: 03/07/19 21:22 Dose:  2 mg


Nicotine (Nicoderm Cq)  1 patch TD DAILY [facility]


   Last Admin: 03/07/19 14:42 Dose:  1 patch


Ondansetron HCl (Zofran Inj)  4 mg IVP Q6H PRN


   PRN Reason: Nausea/Vomiting


Pantoprazole Sodium (Protonix Inj)  40 mg IVP DAILY [facility]


   Last Admin: 03/07/19 09:55 Dose:  40 mg











- Labs


Labs: 


                                        





                                 03/08/19 06:10 





                                 03/07/19 06:50 





                                        











PT  14.4 SECONDS (9.4-12.5)  H  03/08/19  06:10    


 


INR  1.27   03/08/19  06:10    


 


APTT  34.0 Seconds (26.9-38.3)   03/07/19  06:50    














- Constitutional


Appears: Well, Non-toxic, No Acute Distress





- Head Exam


Head Exam: ATRAUMATIC, NORMOCEPHALIC





- Eye Exam


Eye Exam: EOMI, Normal Appearance 





- ENT Exam


ENT Exam: Mucous Membranes Moist





- Respiratory Exam


Respiratory Exam: NORMAL BREATHING PATTERN, Clear to Auscultation Bilateral. 

absent: Wheezes, Ronchi, Respiratory Distress 





- Cardiovascular Exam


Cardiovascular Exam: REGULAR RHYTHM, +S1 and +S2





- GI/Abdominal Exam


GI & Abdominal Exam: Soft, Tenderness.  absent: Guarding, Distended, Rigid, 

Rebound Tenderness





- Extremities Exam


Extremities exam: Normal Inspection. Negative for: calf tenderness, pedal edema





- Neurological Exam


Neurological exam: Alert, Oriented x3





- Psychiatric Exam


Psychiatric exam: Normal Affect, Normal Mood





- Skin


Skin Exam: Dry, Intact, Normal Color, Warm





Assessment and Plan





- Assessment and Plan (Free Text)


Assessment: 





Patient is a 58 year old male with past medical history of hyperlipidemia 

presenting with abdominal pain and admitted for management of acute 

cholecystitis, now s/p subtotal cholecystectomy. 


Plan: 





Cholecystitis 


- s/p laparoscopic converted to open subtotal cholecystectomy


- CTAP shows 2.0 cm partially calcified cholelith in gallbladder neck, 

pericholecystic edema


- leukocytosis trending down, afebrile 


- BCx neg x2


- advance diet as tolerated 


- Dilaudid, Percocet PRN 


- Zofran PRN 


- IVF LR @ 100 ccs/hr 





Hypokalemia


- monitor and replete PRN 





Tobacco abuse


- nicotine patch


- cessation counseling 





PPX


- SCDs, Heparin SC, Protonix 





Case reviewed with Dr. Bria Harvey PGY-1 














<Ebonie Fraser - Last Filed: 03/09/19 14:45>





Objective





- Vital Signs/Intake and Output


Vital Signs (last 24 hours): 


                                        











Temp Pulse Resp BP Pulse Ox


 


 98.5 F   75   12   109/60   97 


 


 03/08/19 15:22  03/08/19 15:22  03/08/19 15:22  03/08/19 15:22  03/08/19 15:22








Intake and Output: 


                                        











 03/08/19 03/08/19





 06:59 18:59


 


Intake Total 480 200


 


Output Total 650 


 


Balance -170 200














- Medications


Medications: 


                               Current Medications





Heparin Sodium (Porcine) (Heparin)  5,000 units SC Q8 [facility]; Protocol


Hydromorphone HCl (Dilaudid)  0.5 mg IVP Q4H PRN


   PRN Reason: Pain, severe (8-10)


Metronidazole (Flagyl)  500 mg in 100 mls @ 100 mls/hr IVPB Q8 [facility]; Protocol


   Last Admin: 03/08/19 11:30 Dose:  100 mls/hr


Ceftriaxone Sodium (Rocephin 1 Gram Ivpb)  1 gm in 100 mls @ 100 mls/hr IVPB 

DAILY [facility]; Protocol


   Last Admin: 03/08/19 11:17 Dose:  100 mls


Lactated Ringer's (Lactated Ringer's)  1,000 mls @ 100 mls/hr IV .Q10H [facility]


   Last Admin: 03/08/19 08:58 Dose:  100 mls/hr


Nicotine (Nicoderm Cq)  1 patch TD DAILY [facility]


   Last Admin: 03/07/19 14:42 Dose:  1 patch


Ondansetron HCl (Zofran Inj)  4 mg IVP Q6H PRN


   PRN Reason: Nausea/Vomiting


Oxycodone/Acetaminophen (Percocet 5/325 Mg Tab)  1 tab PO Q4H PRN


   PRN Reason: Pain, moderate (4-7)


   Stop: 03/11/19 14:21


Pantoprazole Sodium (Protonix Inj)  40 mg IVP DAILY [facility]


   Last Admin: 03/07/19 09:55 Dose:  40 mg











- Labs


Labs: 


                                        





                                 03/08/19 06:10 





                                 03/08/19 06:10 





                                        











PT  14.4 SECONDS (9.4-12.5)  H  03/08/19  06:10    


 


INR  1.27   03/08/19  06:10    


 


APTT  34.0 Seconds (26.9-38.3)   03/07/19  06:50    














Attending/Attestation





- Attestation


I have personally seen and examined this patient.: Yes


I have fully participated in the care of the patient.: Yes


I have reviewed all pertinent clinical information, including history, physical 

exam and plan: Yes


Notes (Text): 





03/08/19 17:07





Attending note;





Patient seen and examined with resident.


Patient's son by the bedside.


s/p open cholecytectomy.


not in acute distress.





Patient is a 58 year old male with past medical history of hyperlipidemia 

presenting with abdominal pain and admitted for management of acute 

cholecystitis, now s/p subtotal cholecystectomy. 





1. S/p Open cholecystectomy with ELIZABET drain.


serosanguineous  fluid in the drain. continue IV rocephin and flagyl.


Monitor closely.


2. pain management with IV dilaudid.


3. GI/DVT prophylaxis.


4. Active smoking: smoking cessation is strongly advised. On nicoderm patch.





upon discharge the patient will follow up with PMD DR. Stokes.

## 2019-03-09 VITALS — OXYGEN SATURATION: 96 %

## 2019-03-09 LAB
ALBUMIN SERPL-MCNC: 3.1 G/DL (ref 3–4.8)
ALBUMIN/GLOB SERPL: 1 {RATIO} (ref 1.1–1.8)
ALT SERPL-CCNC: 29 U/L (ref 7–56)
AST SERPL-CCNC: 38 U/L (ref 17–59)
BASOPHILS # BLD AUTO: 0.01 K/MM3 (ref 0–2)
BASOPHILS NFR BLD: 0.1 % (ref 0–3)
BUN SERPL-MCNC: 8 MG/DL (ref 7–21)
CALCIUM SERPL-MCNC: 8.1 MG/DL (ref 8.4–10.5)
EOSINOPHIL # BLD: 0 10*3/UL (ref 0–0.7)
EOSINOPHIL NFR BLD: 0.3 % (ref 1.5–5)
ERYTHROCYTE [DISTWIDTH] IN BLOOD BY AUTOMATED COUNT: 14 % (ref 11.5–14.5)
GFR NON-AFRICAN AMERICAN: > 60
HGB BLD-MCNC: 13.3 G/DL (ref 14–18)
LYMPHOCYTES # BLD: 1.3 10*3/UL (ref 1.2–3.4)
LYMPHOCYTES NFR BLD AUTO: 11.3 % (ref 22–35)
MCH RBC QN AUTO: 28.9 PG (ref 25–35)
MCHC RBC AUTO-ENTMCNC: 33 G/DL (ref 31–37)
MCV RBC AUTO: 87.4 FL (ref 80–105)
MONOCYTES # BLD AUTO: 1.3 10*3/UL (ref 0.1–0.6)
MONOCYTES NFR BLD: 11.6 % (ref 1–6)
PLATELET # BLD: 219 10^3/UL (ref 120–450)
PMV BLD AUTO: 10.7 FL (ref 7–11)
RBC # BLD AUTO: 4.61 10^6/UL (ref 3.5–6.1)
WBC # BLD AUTO: 11.6 10^3/UL (ref 4.5–11)

## 2019-03-09 RX ADMIN — METRONIDAZOLE SCH MLS/HR: 500 INJECTION, SOLUTION INTRAVENOUS at 21:06

## 2019-03-09 RX ADMIN — METRONIDAZOLE SCH MLS/HR: 500 INJECTION, SOLUTION INTRAVENOUS at 07:19

## 2019-03-09 RX ADMIN — OXYCODONE HYDROCHLORIDE AND ACETAMINOPHEN PRN TAB: 5; 325 TABLET ORAL at 21:15

## 2019-03-09 RX ADMIN — OXYCODONE HYDROCHLORIDE AND ACETAMINOPHEN PRN TAB: 5; 325 TABLET ORAL at 14:26

## 2019-03-09 RX ADMIN — HYDROMORPHONE HYDROCHLORIDE PRN MG: 1 INJECTION, SOLUTION INTRAMUSCULAR; INTRAVENOUS; SUBCUTANEOUS at 13:12

## 2019-03-09 RX ADMIN — HYDROMORPHONE HYDROCHLORIDE PRN MG: 1 INJECTION, SOLUTION INTRAMUSCULAR; INTRAVENOUS; SUBCUTANEOUS at 08:03

## 2019-03-09 RX ADMIN — CEFTRIAXONE SCH MLS/HR: 1 INJECTION, SOLUTION INTRAVENOUS at 09:15

## 2019-03-09 RX ADMIN — DEXTROSE AND SODIUM CHLORIDE SCH MLS/HR: 5; 450 INJECTION, SOLUTION INTRAVENOUS at 08:02

## 2019-03-09 RX ADMIN — METRONIDAZOLE SCH MLS/HR: 500 INJECTION, SOLUTION INTRAVENOUS at 13:13

## 2019-03-09 NOTE — CP.PCM.PN
Subjective





- Date & Time of Evaluation


Date of Evaluation: 03/09/19


Time of Evaluation: 06:40





- Subjective


Subjective: 





General surgery progress note for Dr. Palumbo





patient seen and examined this am at bedside.  Overnight patient became febrile 

to 101.3 but resolved with fluids and tylenol.  Patient endorses abdominal pain 

over the incision site but no other abdominal pain.  Patient otherwise denies 

HA, CP SOB, n/v, chills and stool changes.  Kg drain with 35 cc 

Serosanguinous overnight.  





Objective





- Vital Signs/Intake and Output


Vital Signs (last 24 hours): 


                                        











Temp Pulse Resp BP Pulse Ox


 


 99.1 F   81   20   121/74   95 


 


 03/09/19 05:45  03/09/19 05:45  03/09/19 05:45  03/09/19 05:45  03/09/19 05:45








Intake and Output: 


                                        











 03/09/19 03/09/19





 06:59 18:59


 


Intake Total 360 


 


Balance 360 














- Medications


Medications: 


                               Current Medications





Acetaminophen (Tylenol 325mg Tab)  650 mg PO Q4H PRN


   PRN Reason: Fever >100.4 F


Heparin Sodium (Porcine) (Heparin)  5,000 units SC Q8 [facility]; Protocol


   Last Admin: 03/09/19 05:48 Dose:  5,000 units


Hydromorphone HCl (Dilaudid)  0.5 mg IVP Q4H PRN


   PRN Reason: Pain, severe (8-10)


   Last Admin: 03/08/19 20:02 Dose:  0.5 mg


Metronidazole (Flagyl)  500 mg in 100 mls @ 100 mls/hr IVPB Q8 [facility]; Protocol


   Last Admin: 03/09/19 07:19 Dose:  100 mls/hr


Ceftriaxone Sodium (Rocephin 1 Gram Ivpb)  1 gm in 100 mls @ 100 mls/hr IVPB 

DAILY [facility]; Protocol


   Last Admin: 03/08/19 11:17 Dose:  100 mls


Lactated Ringer's (Lactated Ringer's)  1,000 mls @ 100 mls/hr IV .Q10H [facility]


   Last Admin: 03/09/19 06:36 Dose:  100 mls/hr


Nicotine (Nicoderm Cq)  1 patch TD DAILY [facility]


   Last Admin: 03/08/19 10:46 Dose:  Not Given


Ondansetron HCl (Zofran Inj)  4 mg IVP Q6H PRN


   PRN Reason: Nausea/Vomiting


Oxycodone/Acetaminophen (Percocet 5/325 Mg Tab)  1 tab PO Q4H PRN


   PRN Reason: Pain, moderate (4-7)


   Stop: 03/11/19 14:21


   Last Admin: 03/08/19 23:36 Dose:  1 tab


Pantoprazole Sodium (Protonix Inj)  40 mg IVP DAILY [facility]


   Last Admin: 03/08/19 10:48 Dose:  Not Given











- Labs


Labs: 


                                        





                                 03/08/19 06:10 





                                 03/08/19 06:10 





                                        











PT  14.4 SECONDS (9.4-12.5)  H  03/08/19  06:10    


 


INR  1.27   03/08/19  06:10    


 


APTT  34.0 Seconds (26.9-38.3)   03/07/19  06:50    














- Constitutional


Appears: Well, Non-toxic, No Acute Distress





- Head Exam


Head Exam: ATRAUMATIC





- Eye Exam


Eye Exam: EOMI





- ENT Exam


ENT Exam: Mucous Membranes Moist





- Respiratory Exam


Respiratory Exam: NORMAL BREATHING PATTERN





- Cardiovascular Exam


Cardiovascular Exam: REGULAR RHYTHM





- GI/Abdominal Exam


GI & Abdominal Exam: Soft, Tenderness (RUQ over incision site).  absent: 

Guarding, Rebound





- Extremities Exam


Extremities Exam: absent: Calf Tenderness





- Neurological Exam


Neurological Exam: Alert, Awake, Oriented x3





- Psychiatric Exam


Psychiatric exam: Normal Affect, Normal Mood





- Skin


Skin Exam: Dry, Intact, Normal Color, Warm





Assessment and Plan





- Assessment and Plan (Free Text)


Assessment: 





58 yr old male s/p lap converted to open cholecystectomy POD 1


Plan: 


Pain control


ADAT


SCD


PT, OOBTC


encourage IS use


tylenol PRN for fevers >100.4


will discuss with Dr. Linwood Grace, PGY 1

## 2019-03-09 NOTE — CP.PCM.PN
<Jude Lyons - Last Filed: 03/09/19 11:36>





Subjective





- Date & Time of Evaluation


Date of Evaluation: 03/09/19


Time of Evaluation: 08:00





- Subjective


Subjective: 


Jude Lyons DO, PGY-1 Hospitalist Progress Note for Dr. Fraser 


Patient was seen and examined at bedside this AM. He reports continued abdominal

pain at the surgical site and difficulty with ambulation since yesterday. 

Otherwise he offers no additional complaints and denies CP, SOB, 

nausea/vomiting, or new urinary complaints. 








Objective





- Vital Signs/Intake and Output


Vital Signs (last 24 hours): 


                                        











Temp Pulse Resp BP Pulse Ox


 


 99.1 F   81   20   121/74   95 


 


 03/09/19 05:45  03/09/19 05:45  03/09/19 05:45  03/09/19 05:45  03/09/19 05:45








Intake and Output: 


                                        











 03/09/19 03/09/19





 06:59 18:59


 


Intake Total 360 


 


Balance 360 














- Medications


Medications: 


                               Current Medications





Acetaminophen (Tylenol 325mg Tab)  650 mg PO Q4H PRN


   PRN Reason: Fever >100.4 F


Enoxaparin Sodium (Lovenox)  40 mg SC DAILY [facility]; Protocol


Hydromorphone HCl (Dilaudid)  0.5 mg IVP Q4H PRN


   PRN Reason: Pain, severe (8-10)


   Last Admin: 03/09/19 08:03 Dose:  0.5 mg


Metronidazole (Flagyl)  500 mg in 100 mls @ 100 mls/hr IVPB Q8 [facility]; Protocol


   Last Admin: 03/09/19 07:19 Dose:  100 mls/hr


Ceftriaxone Sodium (Rocephin 1 Gram Ivpb)  1 gm in 100 mls @ 100 mls/hr IVPB 

DAILY [facility]; Protocol


   Last Admin: 03/09/19 09:15 Dose:  100 mls/hr


Dextrose/Sodium Chloride (Dextrose 5%/0.45% Ns 1000 Ml)  1,000 mls @ 75 mls/hr 

IV .[id number] [facility]


   Last Admin: 03/09/19 08:02 Dose:  75 mls/hr


Nicotine (Nicoderm Cq)  1 patch TD DAILY [facility]


   Last Admin: 03/08/19 10:46 Dose:  Not Given


Ondansetron HCl (Zofran Inj)  4 mg IVP Q6H PRN


   PRN Reason: Nausea/Vomiting


Oxycodone/Acetaminophen (Percocet 5/325 Mg Tab)  1 tab PO Q4H PRN


   PRN Reason: Pain, moderate (4-7)


   Stop: 03/11/19 14:21


   Last Admin: 03/08/19 23:36 Dose:  1 tab


Pantoprazole Sodium (Protonix Inj)  40 mg IVP DAILY [facility]


   Last Admin: 03/09/19 09:15 Dose:  40 mg











- Labs


Labs: 


                                        





                                 03/09/19 06:45 





                                 03/09/19 06:45 





                                        











PT  14.4 SECONDS (9.4-12.5)  H  03/08/19  06:10    


 


INR  1.27   03/08/19  06:10    


 


APTT  34.0 Seconds (26.9-38.3)   03/07/19  06:50    














- Constitutional


Appears: Non-toxic, No Acute Distress





- Head Exam


Head Exam: ATRAUMATIC, NORMOCEPHALIC





- Eye Exam


Eye Exam: EOMI, PERRL





- ENT Exam


ENT Exam: Mucous Membranes Moist





- Neck Exam


Neck Exam: Full ROM, Normal Inspection





- Respiratory Exam


Respiratory Exam: Clear to Ausculation Bilateral, NORMAL BREATHING PATTERN.  

absent: Rales, Rhonchi, Wheezes





- Cardiovascular Exam


Cardiovascular Exam: REGULAR RHYTHM, RRR, +S1, +S2.  absent: Gallop, Rubs, 

Murmur





- GI/Abdominal Exam


GI & Abdominal Exam: Tenderness (greatest RUQ), Normal Bowel Sounds.  absent: 

Guarding, Rebound


Additional comments: 


laparoscopic and RUQ surgical wounds clean, dry, intact, ELIZABET drain with 

serosanguinous fluid 20 cc overnight








- Extremities Exam


Extremities Exam: Full ROM.  absent: Pedal Edema





- Back Exam


Back Exam: NORMAL INSPECTION





- Neurological Exam


Neurological Exam: Alert, Awake, Oriented x3





- Psychiatric Exam


Psychiatric exam: Normal Affect, Normal Mood





- Skin


Skin Exam: Dry, Intact, Warm





Assessment and Plan





- Assessment and Plan (Free Text)


Assessment: 


59 yo M with PMH of HLD [place] [facility] [place] and was 

subsequently admitted for management of acute cholecystitis. Patient is now s/p 

lap converted to open cholecystectomy POD 2.


Plan: 


Post-op Fever/Leukocytosis


Patient was noted to be febrile overnight with Tmax 101.3


Patient was given tylenol which improved fever


Continue tylenol PRN fever


Will continue to monitor


Encourage regular ambulation, OOB to chair, and IS use


Continue rocephin/flagyl





Cholecystitis


Patient is now s/p open cholecystectomy POD 2


Continue PT, OOB to chair, IS use


Continue rocephin/flagyl


Continue pain management


Zofran PRN


Advance diet as tolerated





Hypokalemia


Remains hypokalemic this morning, continue to replace


Recheck in AM





Tobacco abuse


Continue to instruct patient on importance of cessation


Nicotine patch PRN





DVT/GI PPX: Lovenox/protonix


Full Code 


liquid diet and ADAT 


Monitor on med/surg





Patient seen, examined with, and plan discussed with my attending Dr. Bria Lyons D.O. 


IM Resident PGY-1 


Pager: [phone]





<Ebonie Fraser - Last Filed: 03/09/19 15:36>





Objective





- Vital Signs/Intake and Output


Vital Signs (last 24 hours): 


                                        











Temp Pulse Resp BP Pulse Ox


 


 99.1 F   81   20   121/74   95 


 


 03/09/19 05:45  03/09/19 05:45  03/09/19 05:45  03/09/19 05:45  03/09/19 05:45








Intake and Output: 


                                        











 03/09/19 03/09/19





 06:59 18:59


 


Intake Total 360 


 


Balance 360 














- Medications


Medications: 


                               Current Medications





Acetaminophen (Tylenol 325mg Tab)  650 mg PO Q4H PRN


   PRN Reason: Fever >100.4 F


Enoxaparin Sodium (Lovenox)  40 mg SC DAILY [facility]; Protocol


Hydromorphone HCl (Dilaudid)  0.5 mg IVP Q4H PRN


   PRN Reason: Pain, severe (8-10)


   Last Admin: 03/09/19 13:12 Dose:  0.5 mg


Metronidazole (Flagyl)  500 mg in 100 mls @ 100 mls/hr IVPB Q8 [facility]; Protocol


   Last Admin: 03/09/19 13:13 Dose:  100 mls/hr


Ceftriaxone Sodium (Rocephin 1 Gram Ivpb)  1 gm in 100 mls @ 100 mls/hr IVPB 

DAILY [facility]; Protocol


   Last Admin: 03/09/19 09:15 Dose:  100 mls/hr


Dextrose/Sodium Chloride (Dextrose 5%/0.45% Ns 1000 Ml)  1,000 mls @ 75 mls/hr 

IV .[id number] [facility]


   Last Admin: 03/09/19 08:02 Dose:  75 mls/hr


Nicotine (Nicoderm Cq)  1 patch TD DAILY [facility]


   Last Admin: 03/09/19 14:24 Dose:  1 patch


Ondansetron HCl (Zofran Inj)  4 mg IVP Q6H PRN


   PRN Reason: Nausea/Vomiting


Oxycodone/Acetaminophen (Percocet 5/325 Mg Tab)  1 tab PO Q4H PRN


   PRN Reason: Pain, moderate (4-7)


   Stop: 03/11/19 14:21


   Last Admin: 03/09/19 14:26 Dose:  1 tab


Pantoprazole Sodium (Protonix Ec Tab)  40 mg PO 0600 [facility]











- Labs


Labs: 


                                        





                                 03/09/19 06:45 





                                 03/09/19 06:45 





                                        











PT  14.4 SECONDS (9.4-12.5)  H  03/08/19  06:10    


 


INR  1.27   03/08/19  06:10    


 


APTT  34.0 Seconds (26.9-38.3)   03/07/19  06:50    














Attending/Attestation





- Attestation


I have personally seen and examined this patient.: Yes


I have fully participated in the care of the patient.: Yes


I have reviewed all pertinent clinical information, including history, physical 

exam and plan: Yes


Notes (Text): 





03/09/19 15:33








Attending note;





Patient seen and examined with resident.


s/p open cholecytectomy.


not in acute distress.


had temp of 101 last night resolved with tylenol.


currently afebrile and nontoxic.


on liquid diet.








Patient is a 58 year old male with past medical history of hyperlipidemia 

presenting with abdominal pain and admitted for management of acute cholec

ystitis, now s/p subtotal cholecystectomy. 





1. S/p Open cholecystectomy with ELIZABET drain.


POD#1.


serosanguineous  fluid in the drain. continue IV rocephin and flagyl.


Monitor closely for fever. 


blood culture is negative. 


2. pain management with IV dilaudid.


3. GI/DVT prophylaxis.


4. Active smoking: smoking cessation is strongly advised. On nicoderm patch.


5. hypokalemia: potassium supplementation ordered.





 out of bed to chair as tolerated.





upon discharge the patient will follow up with PMD DR. Stokes.

## 2019-03-10 LAB
ALBUMIN SERPL-MCNC: 3 G/DL (ref 3–4.8)
ALBUMIN/GLOB SERPL: 0.9 {RATIO} (ref 1.1–1.8)
ALT SERPL-CCNC: 25 U/L (ref 7–56)
AST SERPL-CCNC: 25 U/L (ref 17–59)
BASOPHILS # BLD AUTO: 0.01 K/MM3 (ref 0–2)
BASOPHILS NFR BLD: 0.1 % (ref 0–3)
BUN SERPL-MCNC: 9 MG/DL (ref 7–21)
CALCIUM SERPL-MCNC: 8.1 MG/DL (ref 8.4–10.5)
EOSINOPHIL # BLD: 0.3 10*3/UL (ref 0–0.7)
EOSINOPHIL NFR BLD: 4 % (ref 1.5–5)
ERYTHROCYTE [DISTWIDTH] IN BLOOD BY AUTOMATED COUNT: 14.2 % (ref 11.5–14.5)
GFR NON-AFRICAN AMERICAN: > 60
HGB BLD-MCNC: 12.4 G/DL (ref 14–18)
LYMPHOCYTES # BLD: 1.5 10*3/UL (ref 1.2–3.4)
LYMPHOCYTES NFR BLD AUTO: 20.1 % (ref 22–35)
MCH RBC QN AUTO: 29.3 PG (ref 25–35)
MCHC RBC AUTO-ENTMCNC: 33.6 G/DL (ref 31–37)
MCV RBC AUTO: 87.2 FL (ref 80–105)
MONOCYTES # BLD AUTO: 0.9 10*3/UL (ref 0.1–0.6)
MONOCYTES NFR BLD: 11.7 % (ref 1–6)
PLATELET # BLD: 246 10^3/UL (ref 120–450)
PMV BLD AUTO: 10.1 FL (ref 7–11)
RBC # BLD AUTO: 4.23 10^6/UL (ref 3.5–6.1)
WBC # BLD AUTO: 7.6 10^3/UL (ref 4.5–11)

## 2019-03-10 RX ADMIN — PANTOPRAZOLE SODIUM SCH MG: 40 TABLET, DELAYED RELEASE ORAL at 05:17

## 2019-03-10 RX ADMIN — METRONIDAZOLE SCH MLS/HR: 500 INJECTION, SOLUTION INTRAVENOUS at 05:17

## 2019-03-10 RX ADMIN — DEXTROSE AND SODIUM CHLORIDE SCH MLS/HR: 5; 450 INJECTION, SOLUTION INTRAVENOUS at 03:36

## 2019-03-10 RX ADMIN — METRONIDAZOLE SCH MLS/HR: 500 INJECTION, SOLUTION INTRAVENOUS at 13:21

## 2019-03-10 RX ADMIN — CEFTRIAXONE SCH MLS/HR: 1 INJECTION, SOLUTION INTRAVENOUS at 09:29

## 2019-03-10 RX ADMIN — OXYCODONE HYDROCHLORIDE AND ACETAMINOPHEN PRN TAB: 5; 325 TABLET ORAL at 22:02

## 2019-03-10 RX ADMIN — ENOXAPARIN SODIUM SCH MG: 40 INJECTION SUBCUTANEOUS at 09:32

## 2019-03-10 RX ADMIN — METRONIDAZOLE SCH MLS/HR: 500 INJECTION, SOLUTION INTRAVENOUS at 21:38

## 2019-03-10 NOTE — CP.PCM.PN
Subjective





- Date & Time of Evaluation


Date of Evaluation: 03/10/19


Time of Evaluation: 08:33





- Subjective


Subjective: 





Surgery Progress note. Dr. Palumbo





Pt seen and examined at bedside. No acute events overnight. Reports flatus. Has 

been tolerating current diet. No N/V/D. Abd pain improving. No new complaints. 

No F/C. 





Objective





- Vital Signs/Intake and Output


Vital Signs (last 24 hours): 


                                        











Temp Pulse Resp BP Pulse Ox


 


 97.6 F   77   20   121/70   96 


 


 03/10/19 08:00  03/10/19 08:00  03/10/19 08:00  03/10/19 08:00  03/10/19 08:00








Intake and Output: 


                                        











 03/10/19 03/10/19





 06:59 18:59


 


Intake Total  


 


Output Total  


 


Balance  














- Medications


Medications: 


                               Current Medications





Acetaminophen (Tylenol 325mg Tab)  650 mg PO Q4H PRN


   PRN Reason: Fever >100.4 F


   Last Admin: 03/09/19 20:43 Dose:  650 mg


Enoxaparin Sodium (Lovenox)  40 mg SC DAILY [facility]; Protocol


Hydromorphone HCl (Dilaudid)  0.5 mg IVP Q4H PRN


   PRN Reason: Pain, severe (8-10)


   Last Admin: 03/09/19 13:12 Dose:  0.5 mg


Metronidazole (Flagyl)  500 mg in 100 mls @ 100 mls/hr IVPB Q8 [facility]; Protocol


   Last Admin: 03/10/19 05:17 Dose:  100 mls/hr


Ceftriaxone Sodium (Rocephin 1 Gram Ivpb)  1 gm in 100 mls @ 100 mls/hr IVPB 

DAILY [facility]; Protocol


   Last Admin: 03/09/19 09:15 Dose:  100 mls/hr


Dextrose/Sodium Chloride (Dextrose 5%/0.45% Ns 1000 Ml)  1,000 mls @ 75 mls/hr 

IV .[id number] [facility]


   Last Admin: 03/10/19 03:36 Dose:  75 mls/hr


Nicotine (Nicoderm Cq)  1 patch TD DAILY [facility]


   Last Admin: 03/09/19 14:24 Dose:  1 patch


Ondansetron HCl (Zofran Inj)  4 mg IVP Q6H PRN


   PRN Reason: Nausea/Vomiting


Oxycodone/Acetaminophen (Percocet 5/325 Mg Tab)  1 tab PO Q4H PRN


   PRN Reason: Pain, moderate (4-7)


   Stop: 03/11/19 14:21


   Last Admin: 03/09/19 21:15 Dose:  1 tab


Pantoprazole Sodium (Protonix Ec Tab)  40 mg PO 0600 [facility]


   Last Admin: 03/10/19 05:17 Dose:  40 mg











- Labs


Labs: 


                                        





                                 03/09/19 06:45 





                                 03/09/19 06:45 





                                        











PT  14.4 SECONDS (9.4-12.5)  H  03/08/19  06:10    


 


INR  1.27   03/08/19  06:10    


 


APTT  34.0 Seconds (26.9-38.3)   03/07/19  06:50    














- Constitutional


Appears: Well, Non-toxic, No Acute Distress





- Head Exam


Head Exam: ATRAUMATIC, NORMAL INSPECTION, NORMOCEPHALIC





- Eye Exam


Eye Exam: EOMI, Normal appearance.  absent: Scleral icterus





- ENT Exam


ENT Exam: Mucous Membranes Moist





- Respiratory Exam


Respiratory Exam: NORMAL BREATHING PATTERN.  absent: Accessory Muscle Use, 

Respiratory Distress





- Cardiovascular Exam


Cardiovascular Exam: RRR.  absent: JVD





- GI/Abdominal Exam


GI & Abdominal Exam: Soft.  absent: Distended, Firm, Guarding, Rigid, Rebound


Additional comments: 





Dressing clean dry and intact. Drain with serosang output. 





- Extremities Exam


Extremities Exam: Normal Inspection.  absent: Calf Tenderness





- Neurological Exam


Neurological Exam: Alert, Awake, Oriented x3





- Psychiatric Exam


Psychiatric exam: Normal Affect, Normal Mood





- Skin


Skin Exam: Dry, Intact, Normal Color, Warm





Assessment and Plan





- Assessment and Plan (Free Text)


Assessment: 





57yo M w acute pop s/p Lap converted to open cholecystectomy. POD 2


Plan: 





- Advance diet as tolerated


- Pain management


- PT eval and treat. OOBTC. Encourage ambulation


- SCDs


- f/u AM labs





Further recs as per Dr. Linwood Lee PGY2


surgery

## 2019-03-10 NOTE — CP.PCM.PN
<Jdue Lyons - Last Filed: 03/10/19 12:51>





Subjective





- Date & Time of Evaluation


Date of Evaluation: 03/10/19


Time of Evaluation: 07:00





- Subjective


Subjective: 


Jude Lyons DO, PGY-1 Hospitalist Progress Note for Dr. Fraser 


Patient was seen and examined at bedside this AM. He reports pain is improved an

d he was able to eat dinner last night without nausea/vomiting. He was able to 

get OOB to chair yesterday and this morning as well. He states the gas pains are

improving.





Objective





- Vital Signs/Intake and Output


Vital Signs (last 24 hours): 


                                        











Temp Pulse Resp BP Pulse Ox


 


 97.6 F   77   20   121/70   96 


 


 03/10/19 08:00  03/10/19 08:00  03/10/19 08:00  03/10/19 08:00  03/10/19 08:00








Intake and Output: 


                                        











 03/10/19 03/10/19





 06:59 18:59


 


Intake Total  


 


Output Total  


 


Balance  














- Medications


Medications: 


                               Current Medications





Acetaminophen (Tylenol 325mg Tab)  650 mg PO Q4H PRN


   PRN Reason: Fever >100.4 F


   Last Admin: 03/09/19 20:43 Dose:  650 mg


Enoxaparin Sodium (Lovenox)  40 mg SC DAILY [facility]; Protocol


   Last Admin: 03/10/19 09:32 Dose:  40 mg


Hydromorphone HCl (Dilaudid)  0.5 mg IVP Q4H PRN


   PRN Reason: Pain, severe (8-10)


   Last Admin: 03/09/19 13:12 Dose:  0.5 mg


Metronidazole (Flagyl)  500 mg in 100 mls @ 100 mls/hr IVPB Q8 [facility]; Protocol


   Last Admin: 03/10/19 05:17 Dose:  100 mls/hr


Ceftriaxone Sodium (Rocephin 1 Gram Ivpb)  1 gm in 100 mls @ 100 mls/hr IVPB 

DAILY [facility]; Protocol


   Last Admin: 03/10/19 09:29 Dose:  100 mls/hr


Dextrose/Sodium Chloride (Dextrose 5%/0.45% Ns 1000 Ml)  1,000 mls @ 75 mls/hr 

IV .[id number] [facility]


   Last Admin: 03/10/19 03:36 Dose:  75 mls/hr


Nicotine (Nicoderm Cq)  1 patch TD DAILY [facility]


   Last Admin: 03/09/19 14:24 Dose:  1 patch


Ondansetron HCl (Zofran Inj)  4 mg IVP Q6H PRN


   PRN Reason: Nausea/Vomiting


Oxycodone/Acetaminophen (Percocet 5/325 Mg Tab)  1 tab PO Q4H PRN


   PRN Reason: Pain, moderate (4-7)


   Stop: 03/11/19 14:21


   Last Admin: 03/09/19 21:15 Dose:  1 tab


Pantoprazole Sodium (Protonix Ec Tab)  40 mg PO 0600 [facility]


   Last Admin: 03/10/19 05:17 Dose:  40 mg











- Labs


Labs: 


                                        





                                 03/10/19 10:00 





                                 03/10/19 10:00 





                                        











PT  14.4 SECONDS (9.4-12.5)  H  03/08/19  06:10    


 


INR  1.27   03/08/19  06:10    


 


APTT  34.0 Seconds (26.9-38.3)   03/07/19  06:50    














- Constitutional


Appears: Non-toxic, No Acute Distress





- Head Exam


Head Exam: ATRAUMATIC, NORMOCEPHALIC





- Eye Exam


Eye Exam: EOMI, PERRL





- ENT Exam


ENT Exam: Mucous Membranes Moist





- Neck Exam


Neck Exam: Full ROM, Normal Inspection





- Respiratory Exam


Respiratory Exam: Clear to Ausculation Bilateral, NORMAL BREATHING PATTERN.  

absent: Accessory Muscle Use, Rales, Rhonchi, Wheezes, Respiratory Distress





- Cardiovascular Exam


Cardiovascular Exam: REGULAR RHYTHM, RRR, +S1, +S2.  absent: Gallop, Rubs, 

Murmur





- GI/Abdominal Exam


GI & Abdominal Exam: Soft, Tenderness (milder tenderness to palpation near 

surgical site, improving), Normal Bowel Sounds.  absent: Guarding, Rebound


Additional comments: 





laparoscopic and RUQ surgical wounds clean, dry, intact, ELIZABET drain with 

serosanguinous fluid, draining less





- Extremities Exam


Extremities Exam: Full ROM, Normal Inspection.  absent: Pedal Edema





- Back Exam


Back Exam: NORMAL INSPECTION





- Neurological Exam


Neurological Exam: Alert, Awake, Oriented x3





- Psychiatric Exam


Psychiatric exam: Normal Affect, Normal Mood





- Skin


Skin Exam: Dry, Intact, Warm





Assessment and Plan





- Assessment and Plan (Free Text)


Assessment: 


57 yo M with PMH of HLD presented [place] [facility] [place] pain and was subs

equently admitted for management of acute cholecystitis. Patient is now s/p lap 

converted to open cholecystectomy POD 3.


Plan: 


Post-op Fever/Leukocytosis


Patient continued to have mild fever overnight, Tmax 100


Leukocytosis resolved, no other SIRS


Continue tylenol PRN fever


Continue to encourage regular ambulation, OOB to chair, and IS use


Continue rocephin/flagyl





Cholecystitis


Patient is now s/p open cholecystectomy POD 3


Continue PT, OOB to chair, IS use


Continue rocephin/flagyl


Continue pain management with percocet


Zofran PRN


Tolerating regular diet well without nausea/vomiting





Hypokalemia


Mildly hypokalemic this morning, continue to replace


Recheck in AM





Tobacco abuse


Continue to instruct patient on importance of cessation


Nicotine patch PRN





DVT/GI PPX: Lovenox/protonix


Full Code 


Regular diet 


Monitor on med/surg





Patient seen, examined with, and plan discussed with my attending Dr. Bria Lyons D.O. 


IM Resident PGY-1 


Pager: [phone]





<Ebonie Fraser - Last Filed: 03/10/19 13:37>





Objective





- Vital Signs/Intake and Output


Vital Signs (last 24 hours): 


                                        











Temp Pulse Resp BP Pulse Ox


 


 97.6 F   77   20   121/70   96 


 


 03/10/19 08:00  03/10/19 08:00  03/10/19 08:00  03/10/19 08:00  03/10/19 08:00








Intake and Output: 


                                        











 03/10/19 03/10/19





 06:59 18:59


 


Intake Total  


 


Output Total  


 


Balance  














- Medications


Medications: 


                               Current Medications





Acetaminophen (Tylenol 325mg Tab)  650 mg PO Q4H PRN


   PRN Reason: Fever >100.4 F


   Last Admin: 03/09/19 20:43 Dose:  650 mg


Enoxaparin Sodium (Lovenox)  40 mg SC DAILY [facility]; Protocol


   Last Admin: 03/10/19 09:32 Dose:  40 mg


Hydromorphone HCl (Dilaudid)  0.5 mg IVP Q4H PRN


   PRN Reason: Pain, severe (8-10)


   Last Admin: 03/09/19 13:12 Dose:  0.5 mg


Metronidazole (Flagyl)  500 mg in 100 mls @ 100 mls/hr IVPB Q8 [facility]; Protocol


   Last Admin: 03/10/19 13:21 Dose:  100 mls/hr


Ceftriaxone Sodium (Rocephin 1 Gram Ivpb)  1 gm in 100 mls @ 100 mls/hr IVPB 

DAILY [facility]; Protocol


   Last Admin: 03/10/19 09:29 Dose:  100 mls/hr


Dextrose/Sodium Chloride (Dextrose 5%/0.45% Ns 1000 Ml)  1,000 mls @ 75 mls/hr 

IV .[id number] [facility]


   Last Admin: 03/10/19 03:36 Dose:  75 mls/hr


Nicotine (Nicoderm Cq)  1 patch TD DAILY [facility]


   Last Admin: 03/09/19 14:24 Dose:  1 patch


Ondansetron HCl (Zofran Inj)  4 mg IVP Q6H PRN


   PRN Reason: Nausea/Vomiting


Oxycodone/Acetaminophen (Percocet 5/325 Mg Tab)  1 tab PO Q4H PRN


   PRN Reason: Pain, moderate (4-7)


   Stop: 03/11/19 14:21


   Last Admin: 03/09/19 21:15 Dose:  1 tab


Pantoprazole Sodium (Protonix Ec Tab)  40 mg PO 0600 [facility]


   Last Admin: 03/10/19 05:17 Dose:  40 mg











- Labs


Labs: 


                                        





                                 03/10/19 10:00 





                                 03/10/19 10:00 





                                        











PT  14.4 SECONDS (9.4-12.5)  H  03/08/19  06:10    


 


INR  1.27   03/08/19  06:10    


 


APTT  34.0 Seconds (26.9-38.3)   03/07/19  06:50    














Attending/Attestation





- Attestation


I have personally seen and examined this patient.: Yes


I have fully participated in the care of the patient.: Yes


I have reviewed all pertinent clinical information, including history, physical 

exam and plan: Yes


Notes (Text): 





03/10/19 13:33








Attending note;





Patient seen and examined with resident.


s/p open cholecytectomy.


not in acute distress.


currently afebrile and nontoxic.


Tolerating liquid diet.








Patient is a 58 year old male with past medical history of hyperlipidemia 

presenting with abdominal pain and admitted for management of acute cholecystit

is, now s/p subtotal cholecystectomy. 





1. S/p Open cholecystectomy with ELIZABET drain.


POD#2.


serosanguineous  fluid in the drain. continue IV rocephin and flagyl.


blood culture is negative.  Urine culture is negative.


Gallbladder was cultures positive for E. coli. 


Clinically stable.  Leukocytosis resolved.


Advance to regular diet today.


2. pain management with IV dilaudid.


3. GI/DVT prophylaxis.


4. Active smoking: smoking cessation is strongly advised. On nicoderm patch.


5. hypokalemia: potassium supplementation ordered.





 out of bed to chair as tolerated.





upon discharge the patient will follow up with PMD DR. Stokes.





 





03/10/19 13:36

## 2019-03-11 VITALS
RESPIRATION RATE: 18 BRPM | HEART RATE: 75 BPM | DIASTOLIC BLOOD PRESSURE: 69 MMHG | TEMPERATURE: 98.6 F | SYSTOLIC BLOOD PRESSURE: 133 MMHG

## 2019-03-11 LAB
ALBUMIN SERPL-MCNC: 3.1 G/DL (ref 3–4.8)
ALBUMIN/GLOB SERPL: 0.9 {RATIO} (ref 1.1–1.8)
ALT SERPL-CCNC: 19 U/L (ref 7–56)
AST SERPL-CCNC: 27 U/L (ref 17–59)
BUN SERPL-MCNC: 9 MG/DL (ref 7–21)
CALCIUM SERPL-MCNC: 8 MG/DL (ref 8.4–10.5)
GFR NON-AFRICAN AMERICAN: > 60

## 2019-03-11 RX ADMIN — ENOXAPARIN SODIUM SCH MG: 40 INJECTION SUBCUTANEOUS at 09:17

## 2019-03-11 RX ADMIN — DEXTROSE AND SODIUM CHLORIDE SCH MLS/HR: 5; 450 INJECTION, SOLUTION INTRAVENOUS at 00:53

## 2019-03-11 RX ADMIN — CEFTRIAXONE SCH MLS/HR: 1 INJECTION, SOLUTION INTRAVENOUS at 09:16

## 2019-03-11 RX ADMIN — METRONIDAZOLE SCH MLS/HR: 500 INJECTION, SOLUTION INTRAVENOUS at 05:39

## 2019-03-11 RX ADMIN — PANTOPRAZOLE SODIUM SCH MG: 40 TABLET, DELAYED RELEASE ORAL at 05:39

## 2019-03-11 NOTE — CP.PCM.PN
Subjective





- Date & Time of Evaluation


Date of Evaluation: 03/11/19


Time of Evaluation: 08:12





- Subjective


Subjective: 





Saba Jefferson PGY1 Progress Note for Dr. Palumbo





Patient was examined at bedside this morning. He reports feeling well. He has no

complaints, denying fever, chills, abdominal pain, nausea, vomiting, diarrhea. 








Objective





- Vital Signs/Intake and Output


Vital Signs (last 24 hours): 


                                        











Temp Pulse Resp BP Pulse Ox


 


 98.6 F   75   18   133/69   96 


 


 03/11/19 06:00  03/11/19 06:00  03/11/19 06:00  03/11/19 06:00  03/11/19 06:00








Intake and Output: 


                                        











 03/11/19 03/11/19





 06:59 18:59


 


Intake Total 800 


 


Output Total 910 


 


Balance -110 














- Medications


Medications: 


                               Current Medications





Acetaminophen (Tylenol 325mg Tab)  650 mg PO Q4H PRN


   PRN Reason: Fever >100.4 F


   Last Admin: 03/09/19 20:43 Dose:  650 mg


Enoxaparin Sodium (Lovenox)  40 mg SC DAILY SANTI; Protocol


   Last Admin: 03/10/19 09:32 Dose:  40 mg


Hydromorphone HCl (Dilaudid)  0.5 mg IVP Q4H PRN


   PRN Reason: Pain, severe (8-10)


   Last Admin: 03/09/19 13:12 Dose:  0.5 mg


Metronidazole (Flagyl)  500 mg in 100 mls @ 100 mls/hr IVPB Q8 SANTI; Protocol


   Last Admin: 03/11/19 05:39 Dose:  100 mls/hr


Ceftriaxone Sodium (Rocephin 1 Gram Ivpb)  1 gm in 100 mls @ 100 mls/hr IVPB 

DAILY SANTI; Protocol


   Last Admin: 03/10/19 09:29 Dose:  100 mls/hr


Nicotine (Nicoderm Cq)  1 patch TD DAILY SANTI


   Last Admin: 03/09/19 14:24 Dose:  1 patch


Ondansetron HCl (Zofran Inj)  4 mg IVP Q6H PRN


   PRN Reason: Nausea/Vomiting


Oxycodone/Acetaminophen (Percocet 5/325 Mg Tab)  1 tab PO Q4H PRN


   PRN Reason: Pain, moderate (4-7)


   Stop: 03/11/19 14:21


   Last Admin: 03/10/19 22:02 Dose:  1 tab


Pantoprazole Sodium (Protonix Ec Tab)  40 mg PO 0600 SANTI


   Last Admin: 03/11/19 05:39 Dose:  40 mg











- Labs


Labs: 


                                        





                                 03/10/19 10:00 





                                 03/11/19 06:30 





                                        











PT  14.4 SECONDS (9.4-12.5)  H  03/08/19  06:10    


 


INR  1.27   03/08/19  06:10    


 


APTT  34.0 Seconds (26.9-38.3)   03/07/19  06:50    














- Constitutional


Appears: Well, No Acute Distress





- Head Exam


Head Exam: ATRAUMATIC, NORMOCEPHALIC





- Eye Exam


Eye Exam: EOMI, Normal appearance


Pupil Exam: NORMAL ACCOMODATION





- Respiratory Exam


Respiratory Exam: Clear to Ausculation Bilateral, NORMAL BREATHING PATTERN.  

absent: Rales, Rhonchi, Wheezes





- Cardiovascular Exam


Cardiovascular Exam: REGULAR RHYTHM, +S1, +S2.  absent: Gallop, Rubs, Murmur





- GI/Abdominal Exam


GI & Abdominal Exam: Soft, Normal Bowel Sounds.  absent: Distended, Tenderness


Additional comments: 





10cc serosanguinous output from drain overnight





- Extremities Exam


Extremities Exam: Normal Inspection.  absent: Pedal Edema





- Neurological Exam


Neurological Exam: Alert, Awake, Oriented x3





- Psychiatric Exam


Psychiatric exam: Normal Affect, Normal Mood





- Skin


Skin Exam: Normal Color





Assessment and Plan





- Assessment and Plan (Free Text)


Assessment: 





59yo M w acute pop s/p Lap converted to open cholecystectomy. POD 3.





Plan: 





- Advance diet as tolerated


- Pain management


- PT eval and treat. OOBTC. Encourage ambulation


- AM labs wnl


- stable for d/c from surgical standpoint





Further recs as per Dr. Palumbo

## 2019-03-11 NOTE — CP.PCM.DIS
<Rusty Elizabeth - Last Filed: 03/11/19 13:07>





Provider





- Provider


Date of Admission: 


03/06/19 22:09





Attending physician: 


Ebonie Fraser MD





Primary care physician: 


Dr. Vazquez


Consults: 








03/06/19 23:28


Physician Consult Routine 


   Comment: 


   Consulting Provider: Javier Palumbo


   Consulting Physician: Javier Palumbo


   Reason for Consult: cholecystitis











Time Spent in preparation of Discharge (in minutes): 40





Hospital Course





- Lab Results


Lab Results: 


                                  Micro Results





03/06/19 22:00   Blood   Blood Culture - Preliminary


                            NO GROWTH AFTER 4 DAYS


03/06/19 21:30   Blood   Blood Culture - Preliminary


                            NO GROWTH AFTER 4 DAYS


03/08/19 15:51   Gallbladder   Gram Stain - Final


03/08/19 15:51   Gallbladder   Wound Culture - Final


                            Escherichia Coli


03/06/19 13:40   Urine Random   Urine Culture - Final


                            No Growth (<1,000 CFU/ML)





                             Most Recent Lab Values











WBC  7.6 10^3/uL (4.5-11.0)  D 03/10/19  10:00    


 


RBC  4.23 10^6/uL (3.5-6.1)   03/10/19  10:00    


 


Hgb  12.4 g/dL (14.0-18.0)  L  03/10/19  10:00    


 


Hct  36.9 % (42.0-52.0)  L  03/10/19  10:00    


 


MCV  87.2 fl (80.0-105.0)   03/10/19  10:00    


 


MCH  29.3 pg (25.0-35.0)   03/10/19  10:00    


 


MCHC  33.6 g/dl (31.0-37.0)   03/10/19  10:00    


 


RDW  14.2 % (11.5-14.5)   03/10/19  10:00    


 


Plt Count  246 10^3/uL (120.0-450.0)   03/10/19  10:00    


 


MPV  10.1 fl (7.0-11.0)   03/10/19  10:00    


 


Neut % (Auto)  64.1 % (50.0-68.0)   03/10/19  10:00    


 


Lymph % (Auto)  20.1 % (22.0-35.0)  L  03/10/19  10:00    


 


Mono % (Auto)  11.7 % (1.0-6.0)  H  03/10/19  10:00    


 


Eos % (Auto)  4.0 % (1.5-5.0)   03/10/19  10:00    


 


Baso % (Auto)  0.1 % (0.0-3.0)   03/10/19  10:00    


 


Lymph # (Auto)  1.5  (1.2-3.4)   03/10/19  10:00    


 


Mono # (Auto)  0.9  (0.1-0.6)  H  03/10/19  10:00    


 


Eos # (Auto)  0.3  (0.0-0.7)   03/10/19  10:00    


 


Baso # (Auto)  0.01 K/mm3 (0.0-2.0)   03/10/19  10:00    


 


Absolute Neuts (auto)  4.84  (1.4-6.5)   03/10/19  10:00    


 


PT  14.4 SECONDS (9.4-12.5)  H  03/08/19  06:10    


 


INR  1.27   03/08/19  06:10    


 


APTT  34.0 Seconds (26.9-38.3)   03/07/19  06:50    


 


Sodium  139 mmol/L (132-148)   03/11/19  06:30    


 


Potassium  3.7 mmol/L (3.6-5.0)   03/11/19  06:30    


 


Chloride  109 mmol/L ()  H  03/11/19  06:30    


 


Carbon Dioxide  24 mmol/L (21-33)   03/11/19  06:30    


 


Anion Gap  10  (10-20)   03/11/19  06:30    


 


BUN  9 mg/dL (7-21)   03/11/19  06:30    


 


Creatinine  0.7 mg/dl (0.8-1.5)  L  03/11/19  06:30    


 


Est GFR ( Amer)  > 60   03/11/19  06:30    


 


Est GFR (Non-Af Amer)  > 60   03/11/19  06:30    


 


Random Glucose  98 mg/dL ()   03/11/19  06:30    


 


Calcium  8.0 mg/dL (8.4-10.5)  L  03/11/19  06:30    


 


Phosphorus  2.4 mg/dL (2.5-4.5)  L  03/09/19  06:45    


 


Magnesium  2.0 mg/dL (1.7-2.2)   03/09/19  06:45    


 


Total Bilirubin  0.4 mg/dL (0.2-1.3)   03/11/19  06:30    


 


AST  27 U/L (17-59)   03/11/19  06:30    


 


ALT  19 U/L (7-56)   03/11/19  06:30    


 


Alkaline Phosphatase  86 U/L ()   03/11/19  06:30    


 


Total Creatine Kinase  88 U/L ()   03/06/19  15:30    


 


Troponin I  < 0.01 ng/mL  03/06/19  15:30    


 


Total Protein  6.4 g/dL (5.8-8.3)   03/11/19  06:30    


 


Albumin  3.1 g/dL (3.0-4.8)   03/11/19  06:30    


 


Globulin  3.3 gm/dL  03/11/19  06:30    


 


Albumin/Globulin Ratio  0.9  (1.1-1.8)  L  03/11/19  06:30    


 


Lipase  54 U/L ()   03/06/19  15:30    


 


Urine Color  Yellow  (YELLOW)   03/06/19  14:40    


 


Urine Appearance  Clear  (CLEAR)   03/06/19  14:40    


 


Urine pH  6.0  (4.7-8.0)   03/06/19  14:40    


 


Ur Specific Gravity  1.025  (1.005-1.035)   03/06/19  14:40    


 


Urine Protein  Negative mg/dL (<30 mg/dL)   03/06/19  14:40    


 


Urine Glucose (UA)  Negative mg/dL (NEGATIVE)   03/06/19  14:40    


 


Urine Ketones  Negative mg/dL (NEGATIVE)   03/06/19  14:40    


 


Urine Blood  Negative  (NEGATIVE)   03/06/19  14:40    


 


Urine Nitrate  Negative  (NEGATIVE)   03/06/19  14:40    


 


Urine Bilirubin  Negative  (NEGATIVE)   03/06/19  14:40    


 


Urine Urobilinogen  0.2 E.U./dL (<1 E.U./dL)   03/06/19  14:40    


 


Ur Leukocyte Esterase  Negative Joseph/uL (NEGATIVE)   03/06/19  14:40    














- Hospital Course


Hospital Course: 


Rusty Elizabeth, PGY-1 Discharge Summary for Hospitalist Service





Hospital Course:


58 year old Amharic speaking M with PMHx of hyperlipidemia who presented on 

3/6/19 with abdominal pain x5 days. Patient states he took some pain medication 

which relieved the pain, but it came back and moved towards his chest and then 

moved towards his abdomen. The pain worsened on day of admission.  Abdomen U/S 

as well as Pelvis CT showed stone in gallbladder neck as well as thickening of 

Gb wall and no CBD dilation. Surgery (Dr. Palumbo) was consulted. Patient was 

initially put NPO, on IV fluids, pain control and anti-emetics.  Patient 

initially put on Rocephin and Flagyl. Patient was subsequently taken to OR for 

cholecystectomy. Laparoscopic procedure was converted to open intraoperatively d

ue to inflamed thickened GB. Culture was taken and subsequently grew E. coli. 

Episodes of hypokalemia was repleted daily as needed. Leukocytosis on admission 

resolved. Patient was then monitored with a ELIZABET drain that had sequentially 

decreasing output over the hospital course and on pain control with Tylenbol, 

Dilaudid, and Percocet.


On day of discharge, patient reported pain is mostly under control.  Surgical 

team removed drain. Per surgical resident and team, no further antibiotics are 

necessary upon discharge. Patient reports mild lanre-incisional abdominal 

tenderness but denies chest pain, palpitations, shortness of breath, dysuria, 

diarrhea, constipation, leg pain, headaches, dizziness.





Patient understood recommendations and instructions upon discharge. All 

questions were answered in detail and to patient satisfaction. Patient was 

provided script for 8 tablets of Percocet for pain control as well as script of 

hospital stay for work. Surgical team had no recommendations for further 

medications.





For further details of hospitalization, please refer to EMR.





Patient seen, case reviewed and plan approved by Dr. PUJA Bailey.








Discharge Exam





- Additional Findings


Additional findings: 





- Constitutional


Appears: Non-toxic, No Acute Distress





- Head Exam


Head Exam: ATRAUMATIC, NORMOCEPHALIC





- Eye Exam


Eye Exam: EOMI, PERRL





- ENT Exam


ENT Exam: Mucous Membranes Moist





- Neck Exam


Neck Exam: Full ROM, Normal Inspection





- Respiratory Exam


Respiratory Exam: Clear to Ausculation Bilateral, NORMAL BREATHING PATTERN.  

absent: Accessory Muscle Use, Rales, Rhonchi, Wheezes, Respiratory Distress





- Cardiovascular Exam


Cardiovascular Exam: REGULAR RHYTHM, RRR, +S1, +S2.  absent: Gallop, Rubs, 

Murmur





- GI/Abdominal Exam


GI & Abdominal Exam: Soft, Tenderness (milder tenderness to palpation near 

surgical site, improving), Normal Bowel Sounds.  absent: Guarding, Rebound


Additional comments: 





laparoscopic and RUQ surgical wounds clean, dry, intact, ELIZABET drain removed





- Extremities Exam


Extremities Exam: Full ROM, Normal Inspection.  absent: Pedal Edema





- Back Exam


Back Exam: NORMAL INSPECTION





- Neurological Exam


Neurological Exam: Alert, Awake, Oriented x3





- Psychiatric Exam


Psychiatric exam: Normal Affect, Normal Mood





- Skin


Skin Exam: Dry, Intact, Warm





Discharge Plan





- Discharge Medications


Prescriptions: 


oxyCODONE/Acetaminophen [Percocet 5/325 mg Tab] 1 ea PO Q6H #8 tab





- Follow Up Plan


Condition: STABLE


Disposition: HOME/ ROUTINE


Instructions:  Acute Abdomen (Belly Pain), Cholecystitis (DC), Cholecystitis 

(GEN)


Additional Instructions: 


- Please follow up with your primary doctor Dr. Vazquez within 3-5 days


- Please follow up with Dr. Palumbo, your surgeon within 1 week or earlier if 

needed


- Please avoid heavy lifting until follow-up in Dr. Palumbo office.


- Patient may shower after 48 hours since your drain was removed per surgery 

recommendations.


- Increase your diet as tolerated, avoid fatty foods


- Encourage increased ambulation and activity as tolerated


- Return to ED if symptoms return





Referrals: 


Bentley Stokes MD [Family Provider] - 


Javier Palumbo MD [Staff Provider] - 





<LeoHailey R - Last Filed: 03/11/19 18:53>





Provider





- Provider


Date of Admission: 


03/06/19 22:09





Attending physician: 


Ebonie Fraser MD





Consults: 








03/06/19 23:28


Physician Consult Routine 


   Comment: 


   Consulting Provider: Javier Palumbo


   Consulting Physician: Javier Palumbo


   Reason for Consult: cholecystitis














Hospital Course





- Lab Results


Lab Results: 


                                  Micro Results





03/06/19 22:00   Blood   Blood Culture - Preliminary


                            NO GROWTH AFTER 4 DAYS


03/06/19 21:30   Blood   Blood Culture - Preliminary


                            NO GROWTH AFTER 4 DAYS


03/08/19 15:51   Gallbladder   Gram Stain - Final


03/08/19 15:51   Gallbladder   Wound Culture - Final


                            Escherichia Coli


03/06/19 13:40   Urine Random   Urine Culture - Final


                            No Growth (<1,000 CFU/ML)





                             Most Recent Lab Values











WBC  7.6 10^3/uL (4.5-11.0)  D 03/10/19  10:00    


 


RBC  4.23 10^6/uL (3.5-6.1)   03/10/19  10:00    


 


Hgb  12.4 g/dL (14.0-18.0)  L  03/10/19  10:00    


 


Hct  36.9 % (42.0-52.0)  L  03/10/19  10:00    


 


MCV  87.2 fl (80.0-105.0)   03/10/19  10:00    


 


MCH  29.3 pg (25.0-35.0)   03/10/19  10:00    


 


MCHC  33.6 g/dl (31.0-37.0)   03/10/19  10:00    


 


RDW  14.2 % (11.5-14.5)   03/10/19  10:00    


 


Plt Count  246 10^3/uL (120.0-450.0)   03/10/19  10:00    


 


MPV  10.1 fl (7.0-11.0)   03/10/19  10:00    


 


Neut % (Auto)  64.1 % (50.0-68.0)   03/10/19  10:00    


 


Lymph % (Auto)  20.1 % (22.0-35.0)  L  03/10/19  10:00    


 


Mono % (Auto)  11.7 % (1.0-6.0)  H  03/10/19  10:00    


 


Eos % (Auto)  4.0 % (1.5-5.0)   03/10/19  10:00    


 


Baso % (Auto)  0.1 % (0.0-3.0)   03/10/19  10:00    


 


Lymph # (Auto)  1.5  (1.2-3.4)   03/10/19  10:00    


 


Mono # (Auto)  0.9  (0.1-0.6)  H  03/10/19  10:00    


 


Eos # (Auto)  0.3  (0.0-0.7)   03/10/19  10:00    


 


Baso # (Auto)  0.01 K/mm3 (0.0-2.0)   03/10/19  10:00    


 


Absolute Neuts (auto)  4.84  (1.4-6.5)   03/10/19  10:00    


 


PT  14.4 SECONDS (9.4-12.5)  H  03/08/19  06:10    


 


INR  1.27   03/08/19  06:10    


 


APTT  34.0 Seconds (26.9-38.3)   03/07/19  06:50    


 


Sodium  139 mmol/L (132-148)   03/11/19  06:30    


 


Potassium  3.7 mmol/L (3.6-5.0)   03/11/19  06:30    


 


Chloride  109 mmol/L ()  H  03/11/19  06:30    


 


Carbon Dioxide  24 mmol/L (21-33)   03/11/19  06:30    


 


Anion Gap  10  (10-20)   03/11/19  06:30    


 


BUN  9 mg/dL (7-21)   03/11/19  06:30    


 


Creatinine  0.7 mg/dl (0.8-1.5)  L  03/11/19  06:30    


 


Est GFR ( Amer)  > 60   03/11/19  06:30    


 


Est GFR (Non-Af Amer)  > 60   03/11/19  06:30    


 


Random Glucose  98 mg/dL ()   03/11/19  06:30    


 


Calcium  8.0 mg/dL (8.4-10.5)  L  03/11/19  06:30    


 


Phosphorus  2.4 mg/dL (2.5-4.5)  L  03/09/19  06:45    


 


Magnesium  2.0 mg/dL (1.7-2.2)   03/09/19  06:45    


 


Total Bilirubin  0.4 mg/dL (0.2-1.3)   03/11/19  06:30    


 


AST  27 U/L (17-59)   03/11/19  06:30    


 


ALT  19 U/L (7-56)   03/11/19  06:30    


 


Alkaline Phosphatase  86 U/L ()   03/11/19  06:30    


 


Total Creatine Kinase  88 U/L ()   03/06/19  15:30    


 


Troponin I  < 0.01 ng/mL  03/06/19  15:30    


 


Total Protein  6.4 g/dL (5.8-8.3)   03/11/19  06:30    


 


Albumin  3.1 g/dL (3.0-4.8)   03/11/19  06:30    


 


Globulin  3.3 gm/dL  03/11/19  06:30    


 


Albumin/Globulin Ratio  0.9  (1.1-1.8)  L  03/11/19  06:30    


 


Lipase  54 U/L ()   03/06/19  15:30    


 


Urine Color  Yellow  (YELLOW)   03/06/19  14:40    


 


Urine Appearance  Clear  (CLEAR)   03/06/19  14:40    


 


Urine pH  6.0  (4.7-8.0)   03/06/19  14:40    


 


Ur Specific Gravity  1.025  (1.005-1.035)   03/06/19  14:40    


 


Urine Protein  Negative mg/dL (<30 mg/dL)   03/06/19  14:40    


 


Urine Glucose (UA)  Negative mg/dL (NEGATIVE)   03/06/19  14:40    


 


Urine Ketones  Negative mg/dL (NEGATIVE)   03/06/19  14:40    


 


Urine Blood  Negative  (NEGATIVE)   03/06/19  14:40    


 


Urine Nitrate  Negative  (NEGATIVE)   03/06/19  14:40    


 


Urine Bilirubin  Negative  (NEGATIVE)   03/06/19  14:40    


 


Urine Urobilinogen  0.2 E.U./dL (<1 E.U./dL)   03/06/19  14:40    


 


Ur Leukocyte Esterase  Negative Joseph/uL (NEGATIVE)   03/06/19  14:40    














Attending/Attestation





- Attestation


I have personally seen and examined this patient.: Yes


I have fully participated in the care of the patient.: Yes


I have reviewed all pertinent clinical information, including history, physical 

exam and plan: Yes


Notes (Text): 


Patient seen and examined by me with resident  at approximately 10:15AM and 

prior to discharge on 3/11/19.  Case including discharge plan discussed with 

resident. Agree with above with following additions/corrections.


Patient is a 58-year-old male with past medical history significant for hy

perlipidemia that presented to the emergency room with back pain radiating to 

the abdomen for 5 days. Please see H&P for full details.


Patient was found to have cholelithiasis and acute cholecystitis. Patient was 

seen by surgical team. CT abdomen and pelvis per radiologist showed 2 cm stone 

in neck of the gallbladder, pericholecystic fluid consistent with cholecystitis.

Abdominal ultrasound per radiologist showed large stone in the neck of the 

gallbladder measuring 2.7 cm in length, thickening of the gallbladder wall 

measuring 6 mm, fatty infiltration of the liver. Patient had laparoscopic con

verted to open subtotal cholecystectomy on 03/08/2019. Drain was placed and 

removed prior to discharge. Patient had leukocytosis of 11.3 on admission. WBC 

on discharge was 7.6. Patient was febrile which resolved. Patient was treated 

with 5 days of IV Rocephin and Flagyl. No more antibiotics needed per surgical 

team. Wound culture showed no growth. Blood cultures showed no growth. Patient 

was hypokalemic which resolved with replacement. Patient was tolerating diet. 

Patient had bowel movement. Patient was ambulating well. Was cleared for 

discharge by surgical team. Patient was counseled at length on tobacco 

cessation. Patient was discharged home.


On day of discharge, patient stated he was feeling much better. Patient stated 

abdominal pain was minimal and he was ambulating well. Patient denied any nausea

or vomiting. Patient was tolerating diet well. No chest pain. No shortness of 

breath or palpitations. No headache or change in vision. No fevers or chills.  

Patient did have a bowel movement. No dysuria.


Physical exam:


General: Awake and alert sitting up in bed in no acute distress


HEENT: Normocephalic, atraumatic. Extraocular muscles intact, pupils equal and 

reactive, no scleral icterus. Oropharynx is pink and moist. No pharyngeal 

erythema or exudate appreciated. Neck is supple. Hearing grossly intact. Ears 

and nose externally unremarkable.


Cardiovascular: Regular rhythm. Normal S1 and S2. No murmurs, rubs, or gallops 

appreciated


Pulmonary: Normal respiratory effort. No rhonchi, rales, or wheezing 

appreciated.


Gastrointestinal: Soft, nondistended. Mild tenderness around incision sites. 

Clean, dry, and intact. Positive bowel sounds all 4 quadrants. No guarding. 


Musculoskeletal:  Moves all extremities. No calf tenderness.  No CVA tenderness.

No edema appreciated.


Central nervous system: AAOx3, CN 2-12 grossly intact. 5/5 muscle strength all 

extremities.


Dermatologic:  Skin warm and dry. 





Please see chart for full details.





Follow up instructions: Patient to follow up with primary medical doctor within 

3-5 days of discharge. Patient to follow-up with surgeon within one week. 

Patient to avoid all heavy lifting until seen by surgeon in the office. Patient 

to wait 48 hours to shower. All instructions explained to the patient in detail.

Patient both understands and agrees to all instructions. Written instructions 

also given. 





Time spent in discharging the patient including chart review, medication 

reconciliation, discussion with the patient, medical resident, consultants, and 

nursing staff was approximately 40 minutes.

## 2019-03-20 NOTE — OP
PROCEDURE DATE:  03/08/2019



PREOPERATIVE DIAGNOSIS:  Acute cholecystitis.



POSTOPERATIVE DIAGNOSIS:  Acute cholecystitis.



PROCEDURE:  Laparoscopic cholecystectomy converted to open cholecystectomy.



SURGEON:  Javier Palumbo MD



ASSISTANTS:

1.  Dr. Serjio Bran, PGY-3.

2.  Dr. Lee, PGY-2.



ANESTHESIA ADMINISTERED BY:  Dr. Hart.



TYPE OF ANESTHESIA:  General anesthesia.



ESTIMATED BLOOD LOSS:  _____.



DESCRIPTION OF PROCEDURE:  The patient was taken to the operating room and

was placed on the operating table in the supine position.  After induction

of general anesthesia, the patient was prepped and draped in the usual

sterile fashion.  A time-out was performed identifying appropriate patient

as well as appropriate surgery and appropriate indications, and that all

preoperative antibiotics were given after the time-out was performed, and

all were in agreement.  Attention was turned towards entering the abdomen. 

A 1 cm supraumbilical incision was made using 11 blade.  Dissection was

then carried out through the underlying fascia using electrocautery. 

Fascia was then grabbed with Kocher and brought up anteriorly to the

incision.  The posterior fascia was then entered using 11 blade exposing

the peritoneum.  Afterward, a Veress needle was inserted and

pneumoperitoneum was achieved.  The abdomen was allowed to insufflate to a

total of 50 mmHg.  At this point, the camera was then entered into the

abdomen and the underlying bowel was inspected and identified to ensure

that there was no iatrogenic enterotomy made upon insertion into the

abdomen.  Everything appeared normal.  The rest of the abdomen was then

examined.  There was no gross pathology outside, although right upper

quadrant of the gallbladder was not immediately visible, but it was clear. 

The omentum and the colon were stuck up into the right upper quadrant with

associated inflammatory changes.  The patient was then placed in the

reverse Trendelenburg to try to further assist visualization.  At this

point, we felt it was safe to proceed with trocar placement.  Further, 5-mm

incisions were made along the subxiphoid using 11 blade.  Two other 5-mm

incisions were placed along the right upper quadrant.  Both 5-mm trocars

were inserted under direct visualization.  Afterwards, the omentum was then

closely dissected down using blunt dissection away from the gallbladder. 

At this point, we had a clear plane to grasp the infundibulum of the

gallbladder with assistance of the retractor.  This was then elevated to

ipsilateral shoulder, and posteriorly, we are trying to give better

exposure of the cystic triangle.  Further dissection was needed to bring

the transverse colon as well as the associated omentum down and away from

the body of the gallbladder.  Consistently taking on time with this further

identified the appropriate point of dissection.  Upon further dissection,

the infundibulum of the gallbladder was difficult to retract as the

gallbladder was quite inflamed.  Given a lack of visualization as well as

the lack of progressing and being unable to obtain a safe window of the

cystic triangle, at this point, intraoperative decision was made to convert

the operation to an open cholecystectomy surgery.  At this point, the

camera was removed along with the remaining trocars.  The abdomen was left

insufflated, and using 15-mm blade, a 10-cm Kocher incision was made in the

right upper quadrant.  Dissection was carried down through the muscular

layer where electrocautery was used to minimize the bleeding, and care was

taken to maintain hemostasis.  After the abdomen was entered under direct

visualization, the air insufflation was allowed to escape.  We then turned

our attention to getting appropriate exposure.  A Seguin was placed on the

gallbladder, and this was elevated superiorly towards the ipsilateral

triangle.  We then inserted a Bookwalter self-retaining retractor with

associated lap pads to protect the underlying bowel to give us appropriate

exposure into the cystic triangle.  Using a right angle dissector, we were

able to circumnavigate the infundibulum as well as the proximal portion of

the cystic duct.  At this point, there was quite a bit of inflammation

noted along the cystic duct which appeared to be in close proximity to the

common bile duct.  At this point, because of the fact that a safe window

was not able to be obtained, the decision was made to carry out a subtotal

cholecystectomy.  The infundibulum was walked down using a right angle

clamp, and then using chromic suture, we passed 2 loops around the lowest

portion of the infundibulum, and we were also able to circumnavigate the

cystic artery by placing another loop along that.  The chromics were then

attached to hemostats allowing to drape off the side of the patient, and no

structures were cut at this time.  We then proceeded with our dissection of

the gallbladder down from the fossa.  This was initially done with

electrocautery, and then once again the appropriate _____ was identified

and further carried out with electrodissection.  After the gallbladder was

completely removed from the liver bed, it was clear that there were only

two structures entering the gallbladder; one which was previously

identified with the cystic artery and the other _____ cystic duct.  Given

that there were no additional structures entering the gallbladder, we

proceeded with ligating the cystic artery.  Again, because of the fact that

the short cystic duct was not able to be appropriately circumnavigated, the

decision was made to do a subtotal cholecystectomy.  At this point, the

infundibulum of the gallbladder was transected.  We then burned the mucosa

of the infundibulum using electrocautery.  Afterwards, two chromic sutures

were used to close the gallbladder wall defect, and the specimen was then

delivered from the field.  We then redirected attention back to the abdomen

assuring that there was appropriate hemostasis which there was.  There was

no evidence of any bile leak at this time.  The right upper quadrant was

irrigated with warm normal saline and then suctioned out.  A 15-Slovak

Kg drain was placed at the right upper quadrant, and then the

self-retaining retractor was removed.  The closure of the abdomen went as

following.  The peritoneum was closed in a continuous fashion using

0-Vicryl suture.  The posterior fascia was then closed in a continuous

fashion using 0 PDS, and the anterior fascia was closed using continuous 0

PDS.  Skin was then closed with staples.  The patient tolerated the

procedure well.  He was extubated successfully within the operating room

and was transferred to PACU in stable condition.





__________________________________________

Serjio Bran DO





__________________________________________

Javier Palumbo MD





DD:  03/19/2019 22:54:05

DT:  03/20/2019 4:14:20

Job # [id number]